# Patient Record
Sex: MALE | Race: WHITE | Employment: OTHER | URBAN - METROPOLITAN AREA
[De-identification: names, ages, dates, MRNs, and addresses within clinical notes are randomized per-mention and may not be internally consistent; named-entity substitution may affect disease eponyms.]

---

## 2017-11-07 PROBLEM — I25.10 CORONARY ARTERY DISEASE INVOLVING NATIVE CORONARY ARTERY OF NATIVE HEART WITHOUT ANGINA PECTORIS: Status: ACTIVE | Noted: 2017-11-07

## 2017-11-07 PROBLEM — Z95.810 ICD (IMPLANTABLE CARDIOVERTER-DEFIBRILLATOR) IN PLACE: Status: ACTIVE | Noted: 2017-11-07

## 2019-02-11 NOTE — PROGRESS NOTES
Patient pre-assessment complete for ICD Generator change with DR Myrna Contreras scheduled for 19 at 2:30pm , arrival time 12:30pm. Patient verified using . Patient instructed to bring all home medications in labeled bottles on the day of procedure. NPO status reinforced. Patient instructed to HOLD spironolactone & torsemide the am of procedure. Instructed they can take all other medications excluding vitamins & supplements. Patient verbalizes understanding of all instructions & denies any questions at this time.

## 2019-02-12 ENCOUNTER — ANESTHESIA EVENT (OUTPATIENT)
Dept: SURGERY | Age: 69
End: 2019-02-12
Payer: MEDICARE

## 2019-02-13 ENCOUNTER — ANESTHESIA (OUTPATIENT)
Dept: SURGERY | Age: 69
End: 2019-02-13
Payer: MEDICARE

## 2019-02-13 ENCOUNTER — HOSPITAL ENCOUNTER (OUTPATIENT)
Age: 69
Setting detail: OUTPATIENT SURGERY
Discharge: HOME | End: 2019-02-13
Attending: INTERNAL MEDICINE | Admitting: INTERNAL MEDICINE
Payer: MEDICARE

## 2019-02-13 ENCOUNTER — APPOINTMENT (OUTPATIENT)
Dept: CARDIAC CATH/INVASIVE PROCEDURES | Age: 69
End: 2019-02-13
Attending: INTERNAL MEDICINE
Payer: MEDICARE

## 2019-02-13 VITALS
OXYGEN SATURATION: 97 % | BODY MASS INDEX: 40.43 KG/M2 | DIASTOLIC BLOOD PRESSURE: 72 MMHG | SYSTOLIC BLOOD PRESSURE: 155 MMHG | RESPIRATION RATE: 20 BRPM | HEIGHT: 74 IN | WEIGHT: 315 LBS | TEMPERATURE: 97 F | HEART RATE: 70 BPM

## 2019-02-13 LAB
ANION GAP SERPL CALC-SCNC: 7 MMOL/L (ref 7–16)
ATRIAL RATE: 78 BPM
BUN SERPL-MCNC: 17 MG/DL (ref 8–23)
CALCIUM SERPL-MCNC: 8.8 MG/DL (ref 8.3–10.4)
CALCULATED R AXIS, ECG10: -62 DEGREES
CALCULATED T AXIS, ECG11: 94 DEGREES
CHLORIDE SERPL-SCNC: 108 MMOL/L (ref 98–107)
CO2 SERPL-SCNC: 26 MMOL/L (ref 21–32)
CREAT SERPL-MCNC: 1.6 MG/DL (ref 0.8–1.5)
DIAGNOSIS, 93000: NORMAL
ERYTHROCYTE [DISTWIDTH] IN BLOOD BY AUTOMATED COUNT: 13.9 % (ref 11.9–14.6)
GLUCOSE BLD STRIP.AUTO-MCNC: 89 MG/DL (ref 65–100)
GLUCOSE SERPL-MCNC: 89 MG/DL (ref 65–100)
HCT VFR BLD AUTO: 41.7 % (ref 41.1–50.3)
HGB BLD-MCNC: 13.1 G/DL (ref 13.6–17.2)
INR PPP: 1
MAGNESIUM SERPL-MCNC: 2 MG/DL (ref 1.8–2.4)
MCH RBC QN AUTO: 29.4 PG (ref 26.1–32.9)
MCHC RBC AUTO-ENTMCNC: 31.4 G/DL (ref 31.4–35)
MCV RBC AUTO: 93.5 FL (ref 79.6–97.8)
NRBC # BLD: 0 K/UL (ref 0–0.2)
PLATELET # BLD AUTO: 209 K/UL (ref 150–450)
PMV BLD AUTO: 9.6 FL (ref 9.4–12.3)
POTASSIUM SERPL-SCNC: 4.2 MMOL/L (ref 3.5–5.1)
PROTHROMBIN TIME: 13 SEC (ref 11.7–14.5)
Q-T INTERVAL, ECG07: 440 MS
QRS DURATION, ECG06: 156 MS
QTC CALCULATION (BEZET), ECG08: 495 MS
RBC # BLD AUTO: 4.46 M/UL (ref 4.23–5.6)
SODIUM SERPL-SCNC: 141 MMOL/L (ref 136–145)
VENTRICULAR RATE, ECG03: 76 BPM
WBC # BLD AUTO: 5.4 K/UL (ref 4.3–11.1)

## 2019-02-13 PROCEDURE — 74011250636 HC RX REV CODE- 250/636: Performed by: INTERNAL MEDICINE

## 2019-02-13 PROCEDURE — 74011250636 HC RX REV CODE- 250/636

## 2019-02-13 PROCEDURE — 33264 RMVL & RPLCMT DFB GEN MLT LD: CPT

## 2019-02-13 PROCEDURE — 77030011747 HC SEAL HEMSTAT TELE -C

## 2019-02-13 PROCEDURE — 93005 ELECTROCARDIOGRAM TRACING: CPT | Performed by: INTERNAL MEDICINE

## 2019-02-13 PROCEDURE — 77030028698 HC BLD TISS PLSM MEDT -D

## 2019-02-13 PROCEDURE — 74011250637 HC RX REV CODE- 250/637: Performed by: INTERNAL MEDICINE

## 2019-02-13 PROCEDURE — C1882 AICD, OTHER THAN SING/DUAL: HCPCS

## 2019-02-13 PROCEDURE — 77030019557 HC ELECTRD VES SEAL MEDT -F

## 2019-02-13 PROCEDURE — 80048 BASIC METABOLIC PNL TOTAL CA: CPT

## 2019-02-13 PROCEDURE — 77030012935 HC DRSG AQUACEL BMS -B

## 2019-02-13 PROCEDURE — 85610 PROTHROMBIN TIME: CPT

## 2019-02-13 PROCEDURE — 77030008467 HC STPLR SKN COVD -B

## 2019-02-13 PROCEDURE — 85027 COMPLETE CBC AUTOMATED: CPT

## 2019-02-13 PROCEDURE — 76060000033 HC ANESTHESIA 1 TO 1.5 HR: Performed by: INTERNAL MEDICINE

## 2019-02-13 PROCEDURE — 74011000250 HC RX REV CODE- 250: Performed by: INTERNAL MEDICINE

## 2019-02-13 PROCEDURE — 82962 GLUCOSE BLOOD TEST: CPT

## 2019-02-13 PROCEDURE — 83735 ASSAY OF MAGNESIUM: CPT

## 2019-02-13 PROCEDURE — 74011000272 HC RX REV CODE- 272: Performed by: INTERNAL MEDICINE

## 2019-02-13 RX ORDER — BUPIVACAINE HYDROCHLORIDE AND EPINEPHRINE 5; 5 MG/ML; UG/ML
60 INJECTION, SOLUTION EPIDURAL; INTRACAUDAL; PERINEURAL ONCE
Status: COMPLETED | OUTPATIENT
Start: 2019-02-13 | End: 2019-02-13

## 2019-02-13 RX ORDER — ACETAMINOPHEN 325 MG/1
325-650 TABLET ORAL
Status: DISCONTINUED | OUTPATIENT
Start: 2019-02-13 | End: 2019-02-13 | Stop reason: HOSPADM

## 2019-02-13 RX ORDER — PROPOFOL 10 MG/ML
INJECTION, EMULSION INTRAVENOUS
Status: DISCONTINUED | OUTPATIENT
Start: 2019-02-13 | End: 2019-02-13 | Stop reason: HOSPADM

## 2019-02-13 RX ORDER — LIDOCAINE HYDROCHLORIDE 20 MG/ML
INJECTION, SOLUTION EPIDURAL; INFILTRATION; INTRACAUDAL; PERINEURAL AS NEEDED
Status: DISCONTINUED | OUTPATIENT
Start: 2019-02-13 | End: 2019-02-13 | Stop reason: HOSPADM

## 2019-02-13 RX ORDER — SODIUM CHLORIDE, SODIUM LACTATE, POTASSIUM CHLORIDE, CALCIUM CHLORIDE 600; 310; 30; 20 MG/100ML; MG/100ML; MG/100ML; MG/100ML
100 INJECTION, SOLUTION INTRAVENOUS CONTINUOUS
Status: DISCONTINUED | OUTPATIENT
Start: 2019-02-13 | End: 2019-02-13 | Stop reason: HOSPADM

## 2019-02-13 RX ORDER — PROPOFOL 10 MG/ML
INJECTION, EMULSION INTRAVENOUS AS NEEDED
Status: DISCONTINUED | OUTPATIENT
Start: 2019-02-13 | End: 2019-02-13 | Stop reason: HOSPADM

## 2019-02-13 RX ORDER — SODIUM CHLORIDE, SODIUM LACTATE, POTASSIUM CHLORIDE, CALCIUM CHLORIDE 600; 310; 30; 20 MG/100ML; MG/100ML; MG/100ML; MG/100ML
INJECTION, SOLUTION INTRAVENOUS
Status: DISCONTINUED | OUTPATIENT
Start: 2019-02-13 | End: 2019-02-13 | Stop reason: HOSPADM

## 2019-02-13 RX ADMIN — Medication 3 G: at 13:42

## 2019-02-13 RX ADMIN — PROPOFOL 100 MCG/KG/MIN: 10 INJECTION, EMULSION INTRAVENOUS at 13:43

## 2019-02-13 RX ADMIN — PROPOFOL 20 MG: 10 INJECTION, EMULSION INTRAVENOUS at 14:25

## 2019-02-13 RX ADMIN — SODIUM CHLORIDE, SODIUM LACTATE, POTASSIUM CHLORIDE, CALCIUM CHLORIDE: 600; 310; 30; 20 INJECTION, SOLUTION INTRAVENOUS at 13:36

## 2019-02-13 RX ADMIN — NEOMYCIN AND POLYMYXIN B SULFATES: 40; 200000 IRRIGANT IRRIGATION at 14:01

## 2019-02-13 RX ADMIN — BUPIVACAINE HYDROCHLORIDE AND EPINEPHRINE BITARTRATE 150 MG: 5; .005 INJECTION, SOLUTION EPIDURAL; INTRACAUDAL; PERINEURAL at 14:01

## 2019-02-13 RX ADMIN — PROPOFOL 20 MG: 10 INJECTION, EMULSION INTRAVENOUS at 14:39

## 2019-02-13 RX ADMIN — PROPOFOL 50 MG: 10 INJECTION, EMULSION INTRAVENOUS at 13:39

## 2019-02-13 RX ADMIN — LIDOCAINE HYDROCHLORIDE 50 MG: 20 INJECTION, SOLUTION EPIDURAL; INFILTRATION; INTRACAUDAL; PERINEURAL at 13:40

## 2019-02-13 RX ADMIN — Medication 3 G: at 17:00

## 2019-02-13 RX ADMIN — ACETAMINOPHEN 650 MG: 325 TABLET, FILM COATED ORAL at 15:00

## 2019-02-13 RX ADMIN — PROPOFOL 20 MG: 10 INJECTION, EMULSION INTRAVENOUS at 14:30

## 2019-02-13 RX ADMIN — PROPOFOL 30 MG: 10 INJECTION, EMULSION INTRAVENOUS at 13:57

## 2019-02-13 NOTE — ANESTHESIA PREPROCEDURE EVALUATION
Anesthetic History No history of anesthetic complications Review of Systems / Medical History Patient summary reviewed and pertinent labs reviewed Pulmonary COPD: moderate Neuro/Psych Within defined limits Cardiovascular Hypertension Dysrhythmias : atrial fibrillation Pacemaker, CAD and CABG Comments: H/O CM - last ECHO '17 showed preserved EF  
GI/Hepatic/Renal 
Within defined limits Endo/Other Morbid obesity Other Findings Physical Exam 
 
Airway Mallampati: III 
TM Distance: 4 - 6 cm Neck ROM: normal range of motion Mouth opening: Normal 
 
 Cardiovascular Rhythm: regular Rate: normal 
 
 
 
 Dental 
 
 
  
Pulmonary Breath sounds clear to auscultation Abdominal 
 
 
 
 Other Findings Anesthetic Plan ASA: 3 Anesthesia type: total IV anesthesia Induction: Intravenous Anesthetic plan and risks discussed with: Patient

## 2019-02-13 NOTE — PROCEDURES
Procedure: BiV ICD Generator Removal/Replacement without DFTs    Pre-Procedure Diagnosis  1. Chronic systolic heart failure, ejection fraction of 55%. 2. Non ischemic dilated cardiomyopathy. 3. Class II-III heart failure symptoms. 4. Chronic systolic heart failure for a minimum of 3 months duration on optimal medical therapy. 5. BiV ICD ANDRÉS  6. Permanent atrial fibrillation    Procedure Performed  1. Insertion/replacement of Medtronic biventricular implantable cardioverter defibrillator. Anesthesia: MAC     Estimated Blood Loss: Less than 10 mL     Specimens: * No specimens in log *     Patient Information and Indications: The procedure, indications, risks, benefits, and alternatives were discussed with the patient and family members, who desired to proceed after questions were answered and informed consent was documented. Methods: After informed consent was obtained, the patient was brought to the Electrophysiology Laboratory in a fasting state and was prepped and draped in sterile fashion. Prophylactic antibiotic was administered prior to skin incision: (Ancef 3 gm). Conscious sedation was administered with continuous oxygen saturation measurement and blood pressure measurement by Anesthesia. Local anesthetic (lidocaine) was delivered to the left pectoral region and an incision was made parallel to the deltopectoral groove directly over the prior surgical scar. The subcutaneous pocket was opened using blunt dissection and electrocautery, and adequate hemostasis was established. The device was freed from overlying fibrotic tissue and the leads freed to give enough slack for device exchange. The leads were individually removed from the old generator and tested using the PSA revealing excellent pacing/sensing parameters. The lead pins were then cleaned with antibiotic soaked gauze, dried gently, and attached to a new biventricular ICD generator.  Pins were directly observed to pass the tip electrode, and the ring hex wrench screws were secured, and leads tug tested. The device and leads were gently positioned within the pocket. The pocket was irrigated copiously with a saline antimicrobial solution. The device was and leads were tested a second time prior to pocket closure. The wound was closed with multiple layers of absorbable suture followed by skin closure with staples. The patient tolerated the procedure well and left the lab in good condition.      Lead Data:    Pulse Generator Model #  Serial # Location Implant   [id number] Amplia SKIP [id number] Left Pectoral 02-13-19   Explant-[id number] MDT [id number] Left pectoral       03-16-12     Lead Model Number  Serial Number Lead position Implant   Chronic RA [id number] MDT [id number] [place] 04-08-08   Chronic RV     2210-59          MDT [id number] RV [place] 04-08-08     Chronic LV       4194-88         MDT [id number] LV 04-08-08             Lead Sensitivity and Threshold  Lead R or P sensitivity (mv) Threshold (V) Threshold PW (msec) Impedance (ohms) Final output Voltage (V) Final PW (msec)   RA n/a   399     RV [email or web address] .75 .4 418 2.0 .4   LV  Tip-Ring  .75 .4 1235 1.75 .4     Bradycardia Settings  Siddharth Mode LRL URL Pace AVD (ms) Sense AVD (ms) Rate Response Mode Switching Mode SW Rate   VVIR 70 130   ON         Tachycardia Settings  Zone Type VT-1 FVT-via VF VF   ON/OFF/  MONITOR           On  on  ATP during charge   Zone Rate           176       (48  beats)               200          (24/32 beats)   1st Therapy Type Burst  x 3   88%  Shock   Energy (J)                35   2nd Therapy Type     shock      Shock   Energy (J)            20  35   3rd Therapy Type      shock  Shock   Energy (J) 35  35   4th Therapy Type shock  Shock   Energy (J) 35  35   5th Therapy Type shock  Shock   Energy (J) 35  35   6th Therapy Type shock  Shock   Energy (J) 35  35           Defibrillation Threshold Testing  DFT# How induced Successful test? Shock Imped (ohms) Energy (J) Charge time (sec) Rescue needed? Defib threshold (J)   n/a                      TYRX model [id number] sn [id number] used in pocket     Fluoroscopy Time:   n/a    Complications: None    IMPRESSION: 1) Successful BiV ICD generator replacement without DFTs. Tomas Lui MD, [place]  Clinical Cardiac Electrophysiology  2/13/2019  2:44 PM

## 2019-02-13 NOTE — DISCHARGE INSTRUCTIONS
PACEMAKER INSTRUCTIONS SHEET  · Keep your incision dry for 10 days. DO NOT put salves, ointments, and/or lotions on the incision. · The pieces of tape on the incision will come off by themselves when you begin washing the site. Please do not pull or tear them off. · You may use your pacemaker arm; but DO NOT raise the arm higher than your shoulder for the first two weeks to prevent the pacemaker lead from moving. DO NOT immobilize your pacemaker arm. · Call us IMMEDIATELY if you develop fever, pain, redness, and/or drainage at the pacemaker arm. · Do not lift more than 10 pounds for 2 weeks and 20 pounds for 1 month. · Microwaves WILL NOT harm your pacemaker. Warning does not apply to you. · Avoid activities which can reprogram your pacemaker such as arc welding, ham radios, and tanning booths. At airports, always show your pacemaker identification card. You may walk through the metal detector, but do not allow the hand held wand near your pacemaker. Do not have a MRI or NMR scan without talking to your cardiologist.  · Your pacemakers function will be evaluated over the telephone. Within 3 weeks you should receive a schedule. If you do not receive a schedule, or if you have questions, you may call [facility]. · Remove the battery from the transmitter after each use. Always keep a spare 9 volt battery. · The pacemaker battery is tested by the heart rate with the magnet applied. This test is done each time you transmit your ECG so it is very important that you follow your schedule. · Carry your pacemaker identification card at all times. Within 6 weeks, you should receive your permanent card. Keep your temporary card as a spare. Call [facility] if you do not receive your card or if you lose your card. · Always show the doctor or dentist your pacemaker identification card.   · If you have questions about your pacemaker or office appointment, please call the office of [facility] at [phone]. · Following the pacemaker implant, you will need to return to the clinic to see your doctor within 1 week. If you did not receive an appointment, please call [facility].

## 2019-02-13 NOTE — PROGRESS NOTES
TRANSFER - IN REPORT:    Verbal report received from [address] Belinda RN(name) on Anuj Ren  being received from EP lab(unit) for routine progression of care      Report consisted of patients Situation, Background, Assessment and   Recommendations(SBAR). Information from the following report(s) Procedure Summary was reviewed with the receiving nurse. Opportunity for questions and clarification was provided. Assessment completed upon patients arrival to unit and care assumed.

## 2019-02-13 NOTE — PROGRESS NOTES
Patient understands that he was scheduled for a JENNIFER today as well as generator change. Patient states he does not want to proceed with the JENNIFER, he \"sees no need in it\" and wants to restart his Coumadin for stroke prevention.

## 2019-02-14 NOTE — ANESTHESIA POSTPROCEDURE EVALUATION
Procedure(s): PACEMAKER GEN CHANGE. Anesthesia Post Evaluation Multimodal analgesia: multimodal analgesia used between 6 hours prior to anesthesia start to PACU discharge Patient location during evaluation: PACU Patient participation: complete - patient participated Level of consciousness: awake and alert Pain management: adequate Airway patency: patent Anesthetic complications: no 
Cardiovascular status: acceptable and hemodynamically stable Respiratory status: acceptable Hydration status: acceptable Visit Vitals /72 Pulse 70 Temp 36.1 °C (97 °F) Resp 20 Ht 6' 2\" (1.88 m) Wt 147 kg (324 lb) SpO2 97% BMI 41.60 kg/m²

## 2020-06-09 PROBLEM — I25.118 CORONARY ARTERY DISEASE WITH STABLE ANGINA PECTORIS (HCC): Status: ACTIVE | Noted: 2020-06-09

## 2022-01-24 ENCOUNTER — HOSPITAL ENCOUNTER (INPATIENT)
Age: 72
LOS: 2 days | Discharge: HOME | DRG: 281 | End: 2022-01-26
Attending: EMERGENCY MEDICINE | Admitting: INTERNAL MEDICINE
Payer: MEDICARE

## 2022-01-24 ENCOUNTER — APPOINTMENT (OUTPATIENT)
Dept: GENERAL RADIOLOGY | Age: 72
DRG: 281 | End: 2022-01-24
Attending: EMERGENCY MEDICINE
Payer: MEDICARE

## 2022-01-24 DIAGNOSIS — I48.20 CHRONIC ATRIAL FIBRILLATION (HCC): ICD-10-CM

## 2022-01-24 DIAGNOSIS — N17.9 AKI (ACUTE KIDNEY INJURY) (HCC): ICD-10-CM

## 2022-01-24 DIAGNOSIS — E78.5 HYPERLIPIDEMIA, UNSPECIFIED HYPERLIPIDEMIA TYPE: ICD-10-CM

## 2022-01-24 DIAGNOSIS — I15.1 HYPERTENSION SECONDARY TO OTHER RENAL DISORDERS: ICD-10-CM

## 2022-01-24 DIAGNOSIS — N28.89 HYPERTENSION SECONDARY TO OTHER RENAL DISORDERS: ICD-10-CM

## 2022-01-24 DIAGNOSIS — I25.10 CORONARY ARTERY DISEASE INVOLVING NATIVE CORONARY ARTERY OF NATIVE HEART WITHOUT ANGINA PECTORIS: ICD-10-CM

## 2022-01-24 DIAGNOSIS — R07.9 CHEST PAIN, UNSPECIFIED TYPE: ICD-10-CM

## 2022-01-24 DIAGNOSIS — I21.4 NSTEMI (NON-ST ELEVATED MYOCARDIAL INFARCTION) (HCC): Primary | ICD-10-CM

## 2022-01-24 DIAGNOSIS — I10 ESSENTIAL HYPERTENSION: ICD-10-CM

## 2022-01-24 PROBLEM — E66.01 MORBID OBESITY (HCC): Chronic | Status: ACTIVE | Noted: 2022-01-24

## 2022-01-24 LAB
ALBUMIN SERPL-MCNC: 3.3 G/DL (ref 3.2–4.6)
ALBUMIN/GLOB SERPL: 0.8 {RATIO} (ref 1.2–3.5)
ALP SERPL-CCNC: 114 U/L (ref 50–136)
ALT SERPL-CCNC: 26 U/L (ref 12–65)
ANION GAP SERPL CALC-SCNC: 8 MMOL/L (ref 7–16)
AST SERPL-CCNC: 42 U/L (ref 15–37)
ATRIAL RATE: 93 BPM
BASOPHILS # BLD: 0.1 K/UL (ref 0–0.2)
BASOPHILS NFR BLD: 1 % (ref 0–2)
BILIRUB SERPL-MCNC: 0.5 MG/DL (ref 0.2–1.1)
BNP SERPL-MCNC: 6059 PG/ML (ref 5–125)
BUN SERPL-MCNC: 26 MG/DL (ref 8–23)
CALCIUM SERPL-MCNC: 9.8 MG/DL (ref 8.3–10.4)
CALCULATED R AXIS, ECG10: -66 DEGREES
CALCULATED T AXIS, ECG11: 130 DEGREES
CHLORIDE SERPL-SCNC: 103 MMOL/L (ref 98–107)
CO2 SERPL-SCNC: 29 MMOL/L (ref 21–32)
CREAT SERPL-MCNC: 2.12 MG/DL (ref 0.8–1.5)
DIAGNOSIS, 93000: NORMAL
DIFFERENTIAL METHOD BLD: ABNORMAL
EOSINOPHIL # BLD: 0.2 K/UL (ref 0–0.8)
EOSINOPHIL NFR BLD: 2 % (ref 0.5–7.8)
ERYTHROCYTE [DISTWIDTH] IN BLOOD BY AUTOMATED COUNT: 12.7 % (ref 11.9–14.6)
EST. AVERAGE GLUCOSE BLD GHB EST-MCNC: 100 MG/DL
GLOBULIN SER CALC-MCNC: 4.3 G/DL (ref 2.3–3.5)
GLUCOSE SERPL-MCNC: 82 MG/DL (ref 65–100)
HBA1C MFR BLD: 5.1 % (ref 4.2–6.3)
HCT VFR BLD AUTO: 44 % (ref 41.1–50.3)
HGB BLD-MCNC: 14 G/DL (ref 13.6–17.2)
IMM GRANULOCYTES # BLD AUTO: 0 K/UL (ref 0–0.5)
IMM GRANULOCYTES NFR BLD AUTO: 0 % (ref 0–5)
LIPASE SERPL-CCNC: 78 U/L (ref 73–393)
LYMPHOCYTES # BLD: 1 K/UL (ref 0.5–4.6)
LYMPHOCYTES NFR BLD: 12 % (ref 13–44)
MAGNESIUM SERPL-MCNC: 2.2 MG/DL (ref 1.8–2.4)
MCH RBC QN AUTO: 29.6 PG (ref 26.1–32.9)
MCHC RBC AUTO-ENTMCNC: 31.8 G/DL (ref 31.4–35)
MCV RBC AUTO: 93 FL (ref 79.6–97.8)
MONOCYTES # BLD: 0.8 K/UL (ref 0.1–1.3)
MONOCYTES NFR BLD: 10 % (ref 4–12)
NEUTS SEG # BLD: 6.1 K/UL (ref 1.7–8.2)
NEUTS SEG NFR BLD: 75 % (ref 43–78)
NRBC # BLD: 0 K/UL (ref 0–0.2)
PLATELET # BLD AUTO: 224 K/UL (ref 150–450)
PMV BLD AUTO: 10.2 FL (ref 9.4–12.3)
POTASSIUM SERPL-SCNC: 3.5 MMOL/L (ref 3.5–5.1)
PROT SERPL-MCNC: 7.6 G/DL (ref 6.3–8.2)
Q-T INTERVAL, ECG07: 414 MS
QRS DURATION, ECG06: 156 MS
QTC CALCULATION (BEZET), ECG08: 495 MS
RBC # BLD AUTO: 4.73 M/UL (ref 4.23–5.6)
SODIUM SERPL-SCNC: 140 MMOL/L (ref 138–145)
TROPONIN-HIGH SENSITIVITY: 4987.1 PG/ML (ref 0–14)
TROPONIN-HIGH SENSITIVITY: 5203.5 PG/ML (ref 0–14)
TSH SERPL DL<=0.005 MIU/L-ACNC: 3.51 UIU/ML (ref 0.36–3.74)
VENTRICULAR RATE, ECG03: 86 BPM
WBC # BLD AUTO: 8.2 K/UL (ref 4.3–11.1)

## 2022-01-24 PROCEDURE — 2709999900 HC NON-CHARGEABLE SUPPLY

## 2022-01-24 PROCEDURE — 74011250637 HC RX REV CODE- 250/637: Performed by: EMERGENCY MEDICINE

## 2022-01-24 PROCEDURE — 65660000000 HC RM CCU STEPDOWN

## 2022-01-24 PROCEDURE — 71046 X-RAY EXAM CHEST 2 VIEWS: CPT

## 2022-01-24 PROCEDURE — 84443 ASSAY THYROID STIM HORMONE: CPT

## 2022-01-24 PROCEDURE — 99223 1ST HOSP IP/OBS HIGH 75: CPT | Performed by: INTERNAL MEDICINE

## 2022-01-24 PROCEDURE — 74011250636 HC RX REV CODE- 250/636: Performed by: PHYSICIAN ASSISTANT

## 2022-01-24 PROCEDURE — 83735 ASSAY OF MAGNESIUM: CPT

## 2022-01-24 PROCEDURE — 99284 EMERGENCY DEPT VISIT MOD MDM: CPT

## 2022-01-24 PROCEDURE — 83690 ASSAY OF LIPASE: CPT

## 2022-01-24 PROCEDURE — 84484 ASSAY OF TROPONIN QUANT: CPT

## 2022-01-24 PROCEDURE — 93005 ELECTROCARDIOGRAM TRACING: CPT | Performed by: EMERGENCY MEDICINE

## 2022-01-24 PROCEDURE — 83880 ASSAY OF NATRIURETIC PEPTIDE: CPT

## 2022-01-24 PROCEDURE — 85025 COMPLETE CBC W/AUTO DIFF WBC: CPT

## 2022-01-24 PROCEDURE — 94762 N-INVAS EAR/PLS OXIMTRY CONT: CPT

## 2022-01-24 PROCEDURE — 74011250637 HC RX REV CODE- 250/637: Performed by: PHYSICIAN ASSISTANT

## 2022-01-24 PROCEDURE — 74011250636 HC RX REV CODE- 250/636: Performed by: EMERGENCY MEDICINE

## 2022-01-24 PROCEDURE — 74011000250 HC RX REV CODE- 250: Performed by: PHYSICIAN ASSISTANT

## 2022-01-24 PROCEDURE — 83036 HEMOGLOBIN GLYCOSYLATED A1C: CPT

## 2022-01-24 PROCEDURE — 80053 COMPREHEN METABOLIC PANEL: CPT

## 2022-01-24 PROCEDURE — 74011000250 HC RX REV CODE- 250: Performed by: EMERGENCY MEDICINE

## 2022-01-24 RX ORDER — SODIUM CHLORIDE 0.9 % (FLUSH) 0.9 %
5-10 SYRINGE (ML) INJECTION AS NEEDED
Status: DISCONTINUED | OUTPATIENT
Start: 2022-01-24 | End: 2022-01-26 | Stop reason: HOSPADM

## 2022-01-24 RX ORDER — SODIUM CHLORIDE 0.9 % (FLUSH) 0.9 %
5-40 SYRINGE (ML) INJECTION EVERY 8 HOURS
Status: DISCONTINUED | OUTPATIENT
Start: 2022-01-24 | End: 2022-01-26 | Stop reason: HOSPADM

## 2022-01-24 RX ORDER — ATORVASTATIN CALCIUM 80 MG/1
80 TABLET, FILM COATED ORAL
Status: DISCONTINUED | OUTPATIENT
Start: 2022-01-24 | End: 2022-01-26 | Stop reason: HOSPADM

## 2022-01-24 RX ORDER — ISOSORBIDE MONONITRATE 60 MG/1
60 TABLET, EXTENDED RELEASE ORAL DAILY
Status: DISCONTINUED | OUTPATIENT
Start: 2022-01-25 | End: 2022-01-24

## 2022-01-24 RX ORDER — HEPARIN SODIUM 1000 [USP'U]/ML
60 INJECTION, SOLUTION INTRAVENOUS; SUBCUTANEOUS ONCE
Status: COMPLETED | OUTPATIENT
Start: 2022-01-24 | End: 2022-01-24

## 2022-01-24 RX ORDER — SODIUM CHLORIDE 0.9 % (FLUSH) 0.9 %
5-40 SYRINGE (ML) INJECTION AS NEEDED
Status: DISCONTINUED | OUTPATIENT
Start: 2022-01-24 | End: 2022-01-26 | Stop reason: HOSPADM

## 2022-01-24 RX ORDER — DOXEPIN HYDROCHLORIDE 10 MG/1
30 CAPSULE ORAL
Status: DISCONTINUED | OUTPATIENT
Start: 2022-01-24 | End: 2022-01-26 | Stop reason: HOSPADM

## 2022-01-24 RX ORDER — SODIUM CHLORIDE 9 MG/ML
75 INJECTION, SOLUTION INTRAVENOUS CONTINUOUS
Status: DISCONTINUED | OUTPATIENT
Start: 2022-01-24 | End: 2022-01-25

## 2022-01-24 RX ORDER — ONDANSETRON 2 MG/ML
4 INJECTION INTRAMUSCULAR; INTRAVENOUS
Status: DISCONTINUED | OUTPATIENT
Start: 2022-01-24 | End: 2022-01-26

## 2022-01-24 RX ORDER — MORPHINE SULFATE 2 MG/ML
2 INJECTION, SOLUTION INTRAMUSCULAR; INTRAVENOUS
Status: DISCONTINUED | OUTPATIENT
Start: 2022-01-24 | End: 2022-01-26

## 2022-01-24 RX ORDER — NITROGLYCERIN 0.4 MG/1
0.4 TABLET SUBLINGUAL
Status: DISCONTINUED | OUTPATIENT
Start: 2022-01-24 | End: 2022-01-26 | Stop reason: HOSPADM

## 2022-01-24 RX ORDER — DULOXETIN HYDROCHLORIDE 60 MG/1
60 CAPSULE, DELAYED RELEASE ORAL
COMMUNITY

## 2022-01-24 RX ORDER — SODIUM CHLORIDE 0.9 % (FLUSH) 0.9 %
5-40 SYRINGE (ML) INJECTION EVERY 8 HOURS
Status: DISCONTINUED | OUTPATIENT
Start: 2022-01-24 | End: 2022-01-25 | Stop reason: HOSPADM

## 2022-01-24 RX ORDER — SODIUM CHLORIDE 0.9 % (FLUSH) 0.9 %
5-40 SYRINGE (ML) INJECTION AS NEEDED
Status: DISCONTINUED | OUTPATIENT
Start: 2022-01-24 | End: 2022-01-25 | Stop reason: HOSPADM

## 2022-01-24 RX ORDER — DOCUSATE SODIUM 100 MG/1
100 CAPSULE, LIQUID FILLED ORAL DAILY
Status: DISCONTINUED | OUTPATIENT
Start: 2022-01-25 | End: 2022-01-26 | Stop reason: HOSPADM

## 2022-01-24 RX ORDER — HEPARIN SODIUM 5000 [USP'U]/100ML
12-25 INJECTION, SOLUTION INTRAVENOUS
Status: DISCONTINUED | OUTPATIENT
Start: 2022-01-24 | End: 2022-01-25

## 2022-01-24 RX ORDER — DULOXETIN HYDROCHLORIDE 60 MG/1
60 CAPSULE, DELAYED RELEASE ORAL DAILY
Status: DISCONTINUED | OUTPATIENT
Start: 2022-01-25 | End: 2022-01-26 | Stop reason: HOSPADM

## 2022-01-24 RX ORDER — SODIUM CHLORIDE 0.9 % (FLUSH) 0.9 %
5-10 SYRINGE (ML) INJECTION EVERY 8 HOURS
Status: DISCONTINUED | OUTPATIENT
Start: 2022-01-24 | End: 2022-01-26 | Stop reason: HOSPADM

## 2022-01-24 RX ORDER — ASPIRIN 81 MG/1
81 TABLET ORAL DAILY
Status: DISCONTINUED | OUTPATIENT
Start: 2022-01-25 | End: 2022-01-26 | Stop reason: HOSPADM

## 2022-01-24 RX ORDER — CARVEDILOL 25 MG/1
25 TABLET ORAL 2 TIMES DAILY WITH MEALS
Status: DISCONTINUED | OUTPATIENT
Start: 2022-01-24 | End: 2022-01-26 | Stop reason: HOSPADM

## 2022-01-24 RX ADMIN — SODIUM CHLORIDE 75 ML/HR: 900 INJECTION, SOLUTION INTRAVENOUS at 18:13

## 2022-01-24 RX ADMIN — HEPARIN SODIUM 6550 UNITS: 1000 INJECTION INTRAVENOUS; SUBCUTANEOUS at 17:25

## 2022-01-24 RX ADMIN — NITROGLYCERIN 1 INCH: 20 OINTMENT TOPICAL at 17:17

## 2022-01-24 RX ADMIN — CARVEDILOL 25 MG: 25 TABLET, FILM COATED ORAL at 18:12

## 2022-01-24 RX ADMIN — SODIUM CHLORIDE, PRESERVATIVE FREE 10 ML: 5 INJECTION INTRAVENOUS at 22:33

## 2022-01-24 RX ADMIN — DOXEPIN HYDROCHLORIDE 30 MG: 10 CAPSULE ORAL at 22:32

## 2022-01-24 RX ADMIN — HEPARIN SODIUM 12 UNITS/KG/HR: 5000 INJECTION, SOLUTION INTRAVENOUS at 17:28

## 2022-01-24 RX ADMIN — MORPHINE SULFATE 2 MG: 2 INJECTION, SOLUTION INTRAMUSCULAR; INTRAVENOUS at 22:32

## 2022-01-24 RX ADMIN — ATORVASTATIN CALCIUM 80 MG: 80 TABLET, FILM COATED ORAL at 22:32

## 2022-01-24 NOTE — Clinical Note
TRANSFER - IN REPORT:     Verbal report received from: 3rd floor . Report consisted of patient's Situation, Background, Assessment and   Recommendations(SBAR). Opportunity for questions and clarification was provided. Assessment completed upon patient's arrival to unit and care assumed.

## 2022-01-24 NOTE — Clinical Note
Multiple views of the internal mammary artery to the obtuse marginal obtained using power injection.

## 2022-01-24 NOTE — ED PROVIDER NOTES
80-year-old male presenting to the emergency department a complaining of chest pain which he describes a squeezing heavy pressure in the mid chest when he is active. Patient states when he is active is only minimal activity to cause the pain. He is notes that started on Friday and has been on and off since. He states that the pain is now radiating down into both of his arms which is new. He states he has never had a stent placed but had a quad bypass done in 2000.  5 years ago he was taken off of his blood thinners except for a baby aspirin daily because of a renal infarct. The patient says that he has had some increased fatigue with his symptoms. He denies any tobacco alcohol or recreational drug use           Past Medical History:   Diagnosis Date    Arrhythmia     a fib; s/p ablation; pacer dependent    Arthritis     Atrial fibrillation ([address] 06/2000    CAD (coronary artery disease)     Chest pain 05/2000    Chronic kidney disease     1 kidney    Chronic obstructive pulmonary disease ([address]     Coronary artery disease involving native coronary artery of native heart without angina pectoris 11/7/2017    Dyspnea 3/1/2016    Edema 08/2010    Heart failure ([address]     Hypercholesterolemia     Hypertension     Ischemic cardiomyopathy 3/1/2016    LV dysfunction 06/2000    Morbid obesity ([address]     Other ill-defined conditions(799.89)     MI x2    Pacemaker 07/2009    Thrombus 03/2012    Unstable angina ([address] 09/2000       Past Surgical History:   Procedure Laterality Date    HX BIV-IMPLANTABLE CARDIOVERTER DEFIBRILLATOR      HX CORONARY ARTERY BYPASS GRAFT      HX PACEMAKER      ICD    [place] CARDIAC SURG PROCEDURE UNLIST      S/P CABG         History reviewed. No pertinent family history.     Social History     Socioeconomic History    Marital status:      Spouse name: Not on file    Number of children: Not on file    Years of education: Not on file    Highest education level: Not on file   Occupational History    Not on file   Tobacco Use    Smoking status: Former Smoker     Packs/day: 1.00     Years: 30.00     Pack years: 30.00     Quit date: 2000     Years since quittin.0    Smokeless tobacco: Current User    Tobacco comment: STOPPED IN    Substance and Sexual Activity    Alcohol use: No    Drug use: No    Sexual activity: Not on file   Other Topics Concern    Not on file   Social History Narrative    Not on file     Social Determinants of Health     Financial Resource Strain:     Difficulty of Paying Living Expenses: Not on file   Food Insecurity:     Worried About Running Out of Food in the Last Year: Not on file    Chad of Food in the Last Year: Not on file   Transportation Needs:     Lack of Transportation (Medical): Not on file    Lack of Transportation (Non-Medical): Not on file   Physical Activity:     Days of Exercise per Week: Not on file    Minutes of Exercise per Session: Not on file   Stress:     Feeling of Stress : Not on file   Social Connections:     Frequency of Communication with Friends and Family: Not on file    Frequency of Social Gatherings with Friends and Family: Not on file    Attends Baptism Services: Not on file    Active Member of [address] [place] [address] [employer] or Organizations: Not on file    Attends Club or Organization Meetings: Not on file    Marital Status: Not on file   Intimate Partner Violence:     Fear of Current or Ex-Partner: Not on file    Emotionally Abused: Not on file    Physically Abused: Not on file    Sexually Abused: Not on file   Housing Stability:     Unable to Pay for Housing in the Last Year: Not on file    Number of [place] in the Last Year: Not on file    Unstable Housing in the Last Year: Not on file         ALLERGIES: Patient has no known allergies. Review of Systems   Respiratory: Positive for chest tightness. Cardiovascular: Positive for chest pain.    All other systems reviewed and are negative. Vitals:    01/24/22 1411 01/24/22 1416   BP: (!) 205/92    Pulse: 86    Resp: 20    Temp: 97.8 °F (36.6 °C)    SpO2: 97%    Weight:  149.7 kg (330 lb)            Physical Exam     GENERAL:The patient has Body mass index is 42.37 kg/m². Well-hydrated. VITAL SIGNS: Heart rate, blood pressure, respiratory rate reviewed as recorded in  nurse's notes  EYES: Pupils reactive. Extraocular motion intact. No conjunctival redness or drainage. NECK: Supple, no meningeal signs. Trachea midline. No masses or thyromegaly. LUNGS: Breath sounds clear and equal bilaterally no accessory muscle use. CHEST: No deformity  CARDIOVASCULAR: Regular rate and rhythm  EXTREMITIES: No clubbing or cyanosis. No joint swelling. Normal muscle tone. No  restricted range of motion appreciated. NEUROLOGIC: Sensation is grossly intact. Cranial nerve exam reveals face is  symmetrical, tongue is midline speech is clear. SKIN: No rash or petechiae. Good skin turgor palpated. PSYCHIATRIC: Alert and oriented. Appropriate behavior and judgment.       MDM  Number of Diagnoses or Management Options  Diagnosis management comments: CHF, COPD, pneumonia, PE,    MI, coronary artery disease, unstable angina, coronary artery disease,    Atrial fibrillation, cardiac arrhythmia, PVC, medication induced palpitations, heart block,  electrolyte induced palpitations,    Aortic dissection, aortic aneurysm,    GERD, musculoskeletal pain, costochondritis, rib fracture, pleurisy,         Amount and/or Complexity of Data Reviewed  Clinical lab tests: ordered and reviewed  Tests in the radiology section of CPT®: ordered and reviewed  Tests in the medicine section of CPT®: ordered and reviewed  Decide to obtain previous medical records or to obtain history from someone other than the patient: yes  Obtain history from someone other than the patient: yes  Discuss the patient with other providers: yes  Independent visualization of images, tracings, or specimens: yes      ED Course as of 01/24/22 1659 Mon Jan 24, 2022   1658 Troponin-High Sensitivity(!!): 6,424.2 []   1659 Cardiology was consulted regarding the need for admission to the hospital and they will come and see him here in the ER []      ED Course User Index  [] Jenne Spatz, DO       EKG    Date/Time: 1/24/2022 4:58 PM  Performed by: Jenne Spatz, DO  Authorized by: Jenne Spatz, DO     ECG reviewed by ED Physician in the absence of a cardiologist: yes    Comments:      Paced rhythm at 86 bpm  Critical Care  Performed by: Jenne Spatz, DO  Authorized by: Jenne Spatz, DO     Comments:      Critical care time: 30 minutes of critical care time was performed in the emergency department. This was separate from any other procedures listed during the patients emergency department course. The failure to initiate these interventions on an urgent basis would likely have resulted in sudden, clinically significant or life-threatening deterioration in the patients condition.

## 2022-01-24 NOTE — ED NOTES
TRANSFER - OUT REPORT:    Verbal report given to juan washington(name) on Marycarmen Caballero  being transferred to [facility](unit) for routine progression of care       Report consisted of patients Situation, Background, Assessment and   Recommendations(SBAR). Information from the following report(s) SBAR was reviewed with the receiving nurse. Lines:   Peripheral IV 01/24/22 Posterior;Right Forearm (Active)        Opportunity for questions and clarification was provided.       Patient transported with:   Monitor  Registered Nurse

## 2022-01-24 NOTE — ED TRIAGE NOTES
Pt reports midsternal chest pain that radiates to bilateral arms, onset Friday. States that he feels relief with nitro.

## 2022-01-24 NOTE — ROUTINE PROCESS
TRANSFER - IN REPORT:    Verbal report received from [address] on Lynda Galindo being received from ED for routine progression of care      Report consisted of patients Situation, Background, Assessment and Recommendations(SBAR). Information from the following report(s) SBAR and Kardex was reviewed with the receiving nurse. Opportunity for questions and clarification was provided. Assessment completed upon patients arrival to unit and care assumed.

## 2022-01-24 NOTE — H&P
[facility] Cardiology History & Physical      Date of  Admission: 1/24/2022  4:45 PM     Primary Care Physician: Dr. Kenny Encarnacion  Primary Cardiologist: Dr. Belgica Toledo  Referring Physician: Dr. Loya Friendly [facility] ED)  Admitting Physician: Dr. Christina Chirinos    CC: chest pain    HPI:  Lynda Galindo is a 70 y.o. male morbidly obese WM with h/o tobacco abuse, HTN, dyslipidemia, CAD s/p CABG 2000, AF off [address] after kidney hemorrhage, ICM s/p Medtronic BiV ICD with improved EF in 2017, CKD who hasn't been seen by Dr. Belgica Toledo since 6/2020 and presents to [facility] ED with c/o several days of angina. He states last Thursday 1/21/22 and Friday 1/22/22, he started having worsening chest pressure with radiation to bilateral arms with associated SOB and fatigue. Pain worsened and he presented to [facility] ED on 1/24/22. In ED, HS troponin elevated at 4987.1, CXR unremarkable and ECG shows paced rhythm with underlying AF. Cardiology asked to evaluate for admission. On exam, Pt is CP free at rest. He reports single kidney since bleed years ago. He takes Goodys powders daily for HA but denies h/o GIB, melena, hematuria or BRBPR. Wife states he stopped taking statin, but unsure when or why.       Past Medical History:   Diagnosis Date    Arrhythmia     a fib; s/p ablation; pacer dependent    Arthritis     Atrial fibrillation ([address] 06/2000    CAD (coronary artery disease)     Chest pain 05/2000    Chronic kidney disease     1 kidney    Chronic obstructive pulmonary disease ([address]     Coronary artery disease involving native coronary artery of native heart without angina pectoris 11/7/2017    Dyspnea 3/1/2016    Edema 08/2010    Heart failure ([address]     Hypercholesterolemia     Hypertension     Ischemic cardiomyopathy 3/1/2016    LV dysfunction 06/2000    Morbid obesity ([address]     Other ill-defined conditions(799.89)     MI x2    Pacemaker 07/2009    Thrombus 03/2012    Unstable angina ([address] 09/2000      Past Surgical History:   Procedure Laterality Date    HX BIV-IMPLANTABLE CARDIOVERTER DEFIBRILLATOR      HX CORONARY ARTERY BYPASS GRAFT      HX PACEMAKER      ICD    NM CARDIAC SURG PROCEDURE UNLIST      S/P CABG       No Known Allergies   Social History     Socioeconomic History    Marital status:      Spouse name: Not on file    Number of children: Not on file    Years of education: Not on file    Highest education level: Not on file   Occupational History    Not on file   Tobacco Use    Smoking status: Former Smoker     Packs/day: 1.00     Years: 30.00     Pack years: 30.00     Quit date: 2000     Years since quittin.0    Smokeless tobacco: Current User    Tobacco comment: STOPPED IN    Substance and Sexual Activity    Alcohol use: No    Drug use: No    Sexual activity: Not on file   Other Topics Concern    Not on file   Social History Narrative    Not on file     Social Determinants of Health     Financial Resource Strain:     Difficulty of Paying Living Expenses: Not on file   Food Insecurity:     Worried About [address] [employer] in the Last Year: Not on file    [address] in the Last Year: Not on file   Transportation Needs:     Lack of Transportation (Medical): Not on file    Lack of Transportation (Non-Medical):  Not on file   Physical Activity:     Days of Exercise per Week: Not on file    Minutes of Exercise per Session: Not on file   Stress:     Feeling of Stress : Not on file   Social Connections:     Frequency of Communication with Friends and Family: Not on file    Frequency of Social Gatherings with Friends and Family: Not on file    Attends Congregational Services: Not on file    Active Member of Clubs or Organizations: Not on file    Attends Club or Organization Meetings: Not on file    Marital Status: Not on file   Intimate Partner Violence:     Fear of Current or Ex-Partner: Not on file    Emotionally Abused: Not on file    Physically Abused: Not on file    Sexually Abused: Not on file   Housing Stability:     Unable to Pay for Housing in the Last Year: Not on file    Number of Places Lived in the Last Year: Not on file    Unstable Housing in the Last Year: Not on file     History reviewed. No pertinent family history. Current Facility-Administered Medications   Medication Dose Route Frequency    sodium chloride (NS) flush 5-10 mL  5-10 mL IntraVENous Q8H    sodium chloride (NS) flush 5-10 mL  5-10 mL IntraVENous PRN    nitroglycerin (NITROBID) 2 % ointment 1 Inch  1 Inch Topical NOW    heparin (porcine) 1,000 unit/mL injection 8,980 Units  60 Units/kg IntraVENous ONCE    heparin 25,000 units in dextrose 500 mL infusion  12-25 Units/kg/hr IntraVENous TITRATE     Current Outpatient Medications   Medication Sig    torsemide (DEMADEX) 100 mg tablet TAKE ONE TABLET BY MOUTH DAILY    carvediloL (COREG) 25 mg tablet TAKE 1 TABLET BY MOUTH TWICE DAILY WITH MEALS    spironolactone (ALDACTONE) 25 mg tablet TAKE 1 TABLET BY MOUTH DAILY    lisinopriL (PRINIVIL, ZESTRIL) 10 mg tablet TAKE ONE HALF TABLET BY MOUTH DAILY    isosorbide mononitrate ER (IMDUR) 60 mg CR tablet TAKE 1 TABLET BY MOUTH EVERY MORNING    lisinopriL (PRINIVIL, ZESTRIL) 5 mg tablet Take 1 Tab by mouth daily.  BUDESONIDE/FORMOTEROL FUMARATE (SYMBICORT IN) Take 2 Puffs by inhalation daily.  docusate sodium (COLACE) 50 mg capsule Take 100 mg by mouth daily.  tiotropium (SPIRIVA WITH HANDIHALER) 18 mcg inhalation capsule Take 1 Cap by inhalation daily.  FLUoxetine (PROZAC) 20 mg capsule Take 60 mg by mouth daily.  aspirin delayed-release 81 mg tablet Take 1 Tab by mouth daily.  doxepin (SINEQUAN) 10 mg capsule Take 30 mg by mouth nightly.  POTASSIUM CHLORIDE PO Take 20 mEq by mouth daily.  nitroglycerin (NITROSTAT) 0.4 mg SL tablet by SubLINGual route every five (5) minutes as needed.  traZODone (DESYREL) 150 mg tablet Take 150 mg by mouth nightly.          Review of symptoms:  General: no recent weight loss/gain, weakness,+ fatigue, fever or chills   Skin: no rashes, lumps, or other skin changes   HEENT: no headache, dizziness, lightheadedness, vision changes, hearing changes, tinnitus, vertigo, sinus pressure/pain, bleeding gums, sore throat, or hoarseness   Neck: no swollen glands, goiter, pain or stiffness   Respiratory: no cough, sputum, hemoptysis, dyspnea, wheezing   Cardiovascular: +chest pain or discomfort, palpitations, +dyspnea, orthopnea, paroxysmal nocturnal dyspnea, no peripheral edema   Gastrointestinal: no trouble swallowing, heartburn, change of appetite, nausea, change in bowel habits, pain with defecation, rectal bleeding or black/tarry stools, hemorrhoids, constipation, diarrhea, abdominal pain, jaundice, liver or gallbladder problems   Urinary: no frequency, urgency , hematuria, burning/pain with urination, recent flank pain, polyuria, nocturia, or difficulty urinating   Peripheral Vascular: no claudication, leg cramps, prior DVTs, swelling of calves, legs, or feet, color change, or swelling with redness or tenderness   Musculoskeletal: no muscle or joint pain/stiffness, joint swelling, erythema of joints, or back pain   Psychiatric: no depression, mental disorders, or excessive stress   Neurological: no history of CVA, dizziness, no sensory or motor loss, seizures, syncope, tremors, numbness, tingling, no changes in mood, attention, or speech, no changes in orientation, memory, insight, or judgment. no headache, vertigo.    Hematologic: no anemia, easy bruising or bleeding   Endocrine: no diabetes, thyroid problems, heat or cold intolerance, excessive sweating, polyuria, polydipsia      Subjective:   Physical Exam  Visit Vitals  BP (!) 184/82   Pulse 70   Temp 97.8 °F (36.6 °C)   Resp 20   Ht 6' 2.02\" (1.88 m)   Wt 149.7 kg (330 lb)   SpO2 97%   BMI 42.35 kg/m²     General Appearance:  Well developed, obese, alert and oriented x 3, and individual in no acute distress. Ears/Nose/Mouth/Throat:   Hearing grossly normal.         Neck: Supple. Chest:   Lungs fairly clear to auscultation bilaterally. Cardiovascular:  Regular rate and rhythm, S1, S2    Abdomen:   Soft, non-tender, bowel sounds are active. Extremities: No edema bilaterally. Skin: Warm and dry. Labs:   Recent Results (from the past 24 hour(s))   EKG, 12 LEAD, INITIAL    Collection Time: 01/24/22  2:13 PM   Result Value Ref Range    Ventricular Rate 86 BPM    Atrial Rate 93 BPM    QRS Duration 156 ms    Q-T Interval 414 ms    QTC Calculation (Bezet) 495 ms    Calculated R Axis -66 degrees    Calculated T Axis 130 degrees    Diagnosis       Ventricular-paced rhythm  Biventricular pacemaker detected  Abnormal ECG  When compared with ECG of 13-FEB-2019 13:01,  Vent. rate has increased BY  10 BPM  Confirmed by Janay Floyd MD (), TITI ABRAHAM ([id number]) on 1/24/2022 3:36:25 PM     TROPONIN-HIGH SENSITIVITY    Collection Time: 01/24/22  2:20 PM   Result Value Ref Range    Troponin-High Sensitivity 4,987.1 (HH) 0 - 14 pg/mL   CBC WITH AUTOMATED DIFF    Collection Time: 01/24/22  2:20 PM   Result Value Ref Range    WBC 8.2 4.3 - 11.1 K/uL    RBC 4.73 4.23 - 5.6 M/uL    HGB 14.0 13.6 - 17.2 g/dL    HCT 44.0 41.1 - 50.3 %    MCV 93.0 79.6 - 97.8 FL    MCH 29.6 26.1 - 32.9 PG    MCHC 31.8 31.4 - 35.0 g/dL    RDW 12.7 11.9 - 14.6 %    PLATELET 393 476 - 777 K/uL    MPV 10.2 9.4 - 12.3 FL    ABSOLUTE NRBC 0.00 0.0 - 0.2 K/uL    DF AUTOMATED      NEUTROPHILS 75 43 - 78 %    LYMPHOCYTES 12 (L) 13 - 44 %    MONOCYTES 10 4.0 - 12.0 %    EOSINOPHILS 2 0.5 - 7.8 %    BASOPHILS 1 0.0 - 2.0 %    IMMATURE GRANULOCYTES 0 0.0 - 5.0 %    ABS. NEUTROPHILS 6.1 1.7 - 8.2 K/UL    ABS. LYMPHOCYTES 1.0 0.5 - 4.6 K/UL    ABS. MONOCYTES 0.8 0.1 - 1.3 K/UL    ABS. EOSINOPHILS 0.2 0.0 - 0.8 K/UL    ABS. BASOPHILS 0.1 0.0 - 0.2 K/UL    ABS. IMM.  GRANS. 0.0 0.0 - 0.5 K/UL   METABOLIC PANEL, COMPREHENSIVE    Collection Time: 01/24/22  2:20 PM   Result Value Ref Range    Sodium 140 138 - 145 mmol/L    Potassium 3.5 3.5 - 5.1 mmol/L    Chloride 103 98 - 107 mmol/L    CO2 29 21 - 32 mmol/L    Anion gap 8 7 - 16 mmol/L    Glucose 82 65 - 100 mg/dL    BUN 26 (H) 8 - 23 MG/DL    Creatinine 2.12 (H) 0.8 - 1.5 MG/DL    GFR est AA 40 (L) >60 ml/min/1.73m2    GFR est non-AA 33 (L) >60 ml/min/1.73m2    Calcium 9.8 8.3 - 10.4 MG/DL    Bilirubin, total 0.5 0.2 - 1.1 MG/DL    ALT (SGPT) 26 12 - 65 U/L    AST (SGOT) 42 (H) 15 - 37 U/L    Alk.  phosphatase 114 50 - 136 U/L    Protein, total 7.6 6.3 - 8.2 g/dL    Albumin 3.3 3.2 - 4.6 g/dL    Globulin 4.3 (H) 2.3 - 3.5 g/dL    A-G Ratio 0.8 (L) 1.2 - 3.5     LIPASE    Collection Time: 01/24/22  2:20 PM   Result Value Ref Range    Lipase 78 73 - 393 U/L   MAGNESIUM    Collection Time: 01/24/22  2:20 PM   Result Value Ref Range    Magnesium 2.2 1.8 - 2.4 mg/dL       Pt has been seen and examined by Dr. Cande Macario and he agrees with the following assessment and plan:     Assessment/Plan:          Diagnosis    NSTEMI- admit to telemetry, serial CE's x 3, ASA, BB, statin, Imdur, start heparin, IVF in prep for [facility] 1/25 AM- check echo, interrogate Medtronic device    CAD (oronary artery disease) s/p CABG 2000- continue ASA, Coreg, Imdur, add statin    H/o Ischemic cardiomyopathy s/p BiV ICD- last echo showed preserved EF 11/2017- check echo, interrogate device    Chronic atrial fibrillation- records indicate off [address] for years after severe kidney hemorrhage, needs to be considered for watchman- IV heparin     Essential hypertension- continue home meds, add/titrate as needed    Hyperlipidemia- start statin, check fasting lipid profile in AM, wife states it was stopped at some point    ICD (implantable cardioverter-defibrillator) in place- BiV ICD Medtronic- will have interrogated, last interrogation documented was 2019    H/o kidney hemorrhage- off [address]    CKD- BUN/Cr elevated- IVF, repeat BMP in AM    Morbid obesity- weight loss encouraged     Dr. Xavier Jesus, PA-C

## 2022-01-25 ENCOUNTER — APPOINTMENT (OUTPATIENT)
Dept: NON INVASIVE DIAGNOSTICS | Age: 72
DRG: 281 | End: 2022-01-25
Attending: PHYSICIAN ASSISTANT
Payer: MEDICARE

## 2022-01-25 LAB
ANION GAP SERPL CALC-SCNC: 4 MMOL/L (ref 7–16)
ATRIAL RATE: 70 BPM
BASOPHILS # BLD: 0.1 K/UL (ref 0–0.2)
BASOPHILS NFR BLD: 1 % (ref 0–2)
BUN SERPL-MCNC: 24 MG/DL (ref 8–23)
CALCIUM SERPL-MCNC: 9.4 MG/DL (ref 8.3–10.4)
CALCULATED P AXIS, ECG09: 48 DEGREES
CALCULATED R AXIS, ECG10: -64 DEGREES
CALCULATED T AXIS, ECG11: 26 DEGREES
CHLORIDE SERPL-SCNC: 104 MMOL/L (ref 98–107)
CHOLEST SERPL-MCNC: 199 MG/DL
CO2 SERPL-SCNC: 30 MMOL/L (ref 21–32)
CREAT SERPL-MCNC: 2.06 MG/DL (ref 0.8–1.5)
DIAGNOSIS, 93000: NORMAL
DIFFERENTIAL METHOD BLD: ABNORMAL
ECHO AO ASC DIAM: 4 CM
ECHO AO ASCENDING AORTA INDEX: 1.49 CM/M2
ECHO AO ROOT DIAM: 4.3 CM
ECHO AO ROOT INDEX: 1.6 CM/M2
ECHO AV AREA PEAK VELOCITY: 2.7 CM2
ECHO AV AREA VTI: 2.7 CM2
ECHO AV AREA/BSA PEAK VELOCITY: 1 CM2/M2
ECHO AV AREA/BSA VTI: 1 CM2/M2
ECHO AV CUSP MM: 2.6 CM
ECHO AV MEAN GRADIENT: 2 MMHG
ECHO AV MEAN VELOCITY: 0.7 M/S
ECHO AV PEAK GRADIENT: 5 MMHG
ECHO AV PEAK VELOCITY: 1.1 M/S
ECHO AV VELOCITY RATIO: 0.64
ECHO AV VTI: 23.3 CM
ECHO EST RA PRESSURE: 8 MMHG
ECHO LA AREA 2C: 28.3 CM2
ECHO LA AREA 4C: 27.7 CM2
ECHO LA DIAMETER INDEX: 1.67 CM/M2
ECHO LA DIAMETER: 4.5 CM
ECHO LA MAJOR AXIS: 7.4 CM
ECHO LA MINOR AXIS: 6.7 CM
ECHO LA TO AORTIC ROOT RATIO: 1.05
ECHO LA VOL BP: 94 ML (ref 18–58)
ECHO LA VOL/BSA BIPLANE: 35 ML/M2 (ref 16–34)
ECHO LV E' LATERAL VELOCITY: 11 CM/S
ECHO LV E' SEPTAL VELOCITY: 10 CM/S
ECHO LV EDV A4C: 117 ML
ECHO LV EDV INDEX A4C: 43 ML/M2
ECHO LV EJECTION FRACTION A4C: 62 %
ECHO LV ESV A4C: 44 ML
ECHO LV ESV INDEX A4C: 16 ML/M2
ECHO LV FRACTIONAL SHORTENING: 31 % (ref 28–44)
ECHO LV INTERNAL DIMENSION DIASTOLE INDEX: 2.27 CM/M2
ECHO LV INTERNAL DIMENSION DIASTOLIC: 6.1 CM (ref 4.2–5.9)
ECHO LV INTERNAL DIMENSION SYSTOLIC INDEX: 1.56 CM/M2
ECHO LV INTERNAL DIMENSION SYSTOLIC: 4.2 CM
ECHO LV IVSD: 1.4 CM (ref 0.6–1)
ECHO LV MASS 2D: 398.3 G (ref 88–224)
ECHO LV MASS INDEX 2D: 148.1 G/M2 (ref 49–115)
ECHO LV POSTERIOR WALL DIASTOLIC: 1.4 CM (ref 0.6–1)
ECHO LV RELATIVE WALL THICKNESS RATIO: 0.46
ECHO LVOT AREA: 4.5 CM2
ECHO LVOT AV VTI INDEX: 0.6
ECHO LVOT DIAM: 2.4 CM
ECHO LVOT MEAN GRADIENT: 1 MMHG
ECHO LVOT PEAK GRADIENT: 2 MMHG
ECHO LVOT PEAK VELOCITY: 0.7 M/S
ECHO LVOT STROKE VOLUME INDEX: 23.5 ML/M2
ECHO LVOT SV: 63.3 ML
ECHO LVOT VTI: 14 CM
ECHO MV A VELOCITY: 0.31 M/S
ECHO MV AREA VTI: 2 CM2
ECHO MV E DECELERATION TIME (DT): 187 MS
ECHO MV E VELOCITY: 1.19 M/S
ECHO MV E/A RATIO: 3.84
ECHO MV E/E' LATERAL: 10.82
ECHO MV E/E' RATIO (AVERAGED): 11.36
ECHO MV E/E' SEPTAL: 11.9
ECHO MV LVOT VTI INDEX: 2.25
ECHO MV MAX VELOCITY: 1.6 M/S
ECHO MV MEAN GRADIENT: 3 MMHG
ECHO MV MEAN VELOCITY: 0.7 M/S
ECHO MV PEAK GRADIENT: 11 MMHG
ECHO MV REGURGITANT PEAK GRADIENT: 112 MMHG
ECHO MV REGURGITANT PEAK VELOCITY: 5.3 M/S
ECHO MV VTI: 31.5 CM
ECHO RIGHT VENTRICULAR SYSTOLIC PRESSURE (RVSP): 31 MMHG
ECHO RV BASAL DIMENSION: 3.1 CM
ECHO RV INTERNAL DIMENSION: 2.8 CM
ECHO RV TAPSE: 2.3 CM (ref 1.5–2)
ECHO TV REGURGITANT MAX VELOCITY: 2.39 M/S
ECHO TV REGURGITANT PEAK GRADIENT: 23 MMHG
EOSINOPHIL # BLD: 0.2 K/UL (ref 0–0.8)
EOSINOPHIL NFR BLD: 2 % (ref 0.5–7.8)
ERYTHROCYTE [DISTWIDTH] IN BLOOD BY AUTOMATED COUNT: 12.9 % (ref 11.9–14.6)
GLUCOSE SERPL-MCNC: 104 MG/DL (ref 65–100)
HCT VFR BLD AUTO: 44.7 % (ref 41.1–50.3)
HDLC SERPL-MCNC: 46 MG/DL (ref 40–60)
HDLC SERPL: 4.3 {RATIO}
HGB BLD-MCNC: 14.2 G/DL (ref 13.6–17.2)
IMM GRANULOCYTES # BLD AUTO: 0 K/UL (ref 0–0.5)
IMM GRANULOCYTES NFR BLD AUTO: 0 % (ref 0–5)
LDLC SERPL CALC-MCNC: 130 MG/DL
LYMPHOCYTES # BLD: 0.9 K/UL (ref 0.5–4.6)
LYMPHOCYTES NFR BLD: 11 % (ref 13–44)
MCH RBC QN AUTO: 29.2 PG (ref 26.1–32.9)
MCHC RBC AUTO-ENTMCNC: 31.8 G/DL (ref 31.4–35)
MCV RBC AUTO: 92 FL (ref 79.6–97.8)
MONOCYTES # BLD: 0.6 K/UL (ref 0.1–1.3)
MONOCYTES NFR BLD: 7 % (ref 4–12)
NEUTS SEG # BLD: 6.2 K/UL (ref 1.7–8.2)
NEUTS SEG NFR BLD: 78 % (ref 43–78)
NRBC # BLD: 0 K/UL (ref 0–0.2)
PLATELET # BLD AUTO: 211 K/UL (ref 150–450)
PMV BLD AUTO: 10.5 FL (ref 9.4–12.3)
POTASSIUM SERPL-SCNC: 3.8 MMOL/L (ref 3.5–5.1)
Q-T INTERVAL, ECG07: 436 MS
QRS DURATION, ECG06: 126 MS
QTC CALCULATION (BEZET), ECG08: 470 MS
RBC # BLD AUTO: 4.86 M/UL (ref 4.23–5.6)
SODIUM SERPL-SCNC: 138 MMOL/L (ref 136–145)
TRIGL SERPL-MCNC: 115 MG/DL (ref 35–150)
TROPONIN-HIGH SENSITIVITY: 4671.9 PG/ML (ref 0–14)
UFH PPP CHRO-ACNC: 0.29 IU/ML (ref 0.3–0.7)
UFH PPP CHRO-ACNC: 0.39 IU/ML (ref 0.3–0.7)
VENTRICULAR RATE, ECG03: 70 BPM
VLDLC SERPL CALC-MCNC: 23 MG/DL (ref 6–23)
WBC # BLD AUTO: 8 K/UL (ref 4.3–11.1)

## 2022-01-25 PROCEDURE — C8929 TTE W OR WO FOL WCON,DOPPLER: HCPCS

## 2022-01-25 PROCEDURE — 93459 L HRT ART/GRFT ANGIO: CPT | Performed by: INTERNAL MEDICINE

## 2022-01-25 PROCEDURE — 77030016699 HC CATH ANGI DX INFN1 CARD -A: Performed by: INTERNAL MEDICINE

## 2022-01-25 PROCEDURE — B2111ZZ FLUOROSCOPY OF MULTIPLE CORONARY ARTERIES USING LOW OSMOLAR CONTRAST: ICD-10-PCS | Performed by: INTERNAL MEDICINE

## 2022-01-25 PROCEDURE — 74011250637 HC RX REV CODE- 250/637: Performed by: NURSE PRACTITIONER

## 2022-01-25 PROCEDURE — 74011000250 HC RX REV CODE- 250: Performed by: PHYSICIAN ASSISTANT

## 2022-01-25 PROCEDURE — 77030022513 HC GD NDL ACUSIT CIVC -B: Performed by: INTERNAL MEDICINE

## 2022-01-25 PROCEDURE — 65660000000 HC RM CCU STEPDOWN

## 2022-01-25 PROCEDURE — 85520 HEPARIN ASSAY: CPT

## 2022-01-25 PROCEDURE — 80061 LIPID PANEL: CPT

## 2022-01-25 PROCEDURE — B2121ZZ FLUOROSCOPY OF SINGLE CORONARY ARTERY BYPASS GRAFT USING LOW OSMOLAR CONTRAST: ICD-10-PCS | Performed by: INTERNAL MEDICINE

## 2022-01-25 PROCEDURE — 74011000636 HC RX REV CODE- 636: Performed by: INTERNAL MEDICINE

## 2022-01-25 PROCEDURE — 74011000250 HC RX REV CODE- 250: Performed by: INTERNAL MEDICINE

## 2022-01-25 PROCEDURE — 36415 COLL VENOUS BLD VENIPUNCTURE: CPT

## 2022-01-25 PROCEDURE — C1760 CLOSURE DEV, VASC: HCPCS | Performed by: INTERNAL MEDICINE

## 2022-01-25 PROCEDURE — 74011250637 HC RX REV CODE- 250/637: Performed by: INTERNAL MEDICINE

## 2022-01-25 PROCEDURE — 2709999900 HC NON-CHARGEABLE SUPPLY

## 2022-01-25 PROCEDURE — 74011250636 HC RX REV CODE- 250/636: Performed by: PHYSICIAN ASSISTANT

## 2022-01-25 PROCEDURE — 77030004558 HC CATH ANGI DX SUPR TORQ CARD -A: Performed by: INTERNAL MEDICINE

## 2022-01-25 PROCEDURE — B2181ZZ FLUOROSCOPY OF LEFT INTERNAL MAMMARY BYPASS GRAFT USING LOW OSMOLAR CONTRAST: ICD-10-PCS | Performed by: INTERNAL MEDICINE

## 2022-01-25 PROCEDURE — 99152 MOD SED SAME PHYS/QHP 5/>YRS: CPT | Performed by: INTERNAL MEDICINE

## 2022-01-25 PROCEDURE — 93005 ELECTROCARDIOGRAM TRACING: CPT | Performed by: INTERNAL MEDICINE

## 2022-01-25 PROCEDURE — 74011250637 HC RX REV CODE- 250/637: Performed by: PHYSICIAN ASSISTANT

## 2022-01-25 PROCEDURE — 74011250636 HC RX REV CODE- 250/636: Performed by: INTERNAL MEDICINE

## 2022-01-25 PROCEDURE — 99024 POSTOP FOLLOW-UP VISIT: CPT | Performed by: INTERNAL MEDICINE

## 2022-01-25 PROCEDURE — C1769 GUIDE WIRE: HCPCS | Performed by: INTERNAL MEDICINE

## 2022-01-25 PROCEDURE — 4A023N7 MEASUREMENT OF CARDIAC SAMPLING AND PRESSURE, LEFT HEART, PERCUTANEOUS APPROACH: ICD-10-PCS | Performed by: INTERNAL MEDICINE

## 2022-01-25 PROCEDURE — 84484 ASSAY OF TROPONIN QUANT: CPT

## 2022-01-25 PROCEDURE — C1894 INTRO/SHEATH, NON-LASER: HCPCS | Performed by: INTERNAL MEDICINE

## 2022-01-25 PROCEDURE — 85025 COMPLETE CBC W/AUTO DIFF WBC: CPT

## 2022-01-25 PROCEDURE — 80048 BASIC METABOLIC PNL TOTAL CA: CPT

## 2022-01-25 PROCEDURE — 74011000250 HC RX REV CODE- 250: Performed by: EMERGENCY MEDICINE

## 2022-01-25 PROCEDURE — 99153 MOD SED SAME PHYS/QHP EA: CPT | Performed by: INTERNAL MEDICINE

## 2022-01-25 DEVICE — ANGIO-SEAL VIP VASCULAR CLOSURE DEVICE
Type: IMPLANTABLE DEVICE | Site: LEG | Status: FUNCTIONAL
Brand: ANGIO-SEAL

## 2022-01-25 RX ORDER — SODIUM CHLORIDE 0.9 % (FLUSH) 0.9 %
5-40 SYRINGE (ML) INJECTION EVERY 8 HOURS
Status: DISCONTINUED | OUTPATIENT
Start: 2022-01-25 | End: 2022-01-26 | Stop reason: HOSPADM

## 2022-01-25 RX ORDER — FENTANYL CITRATE 50 UG/ML
INJECTION, SOLUTION INTRAMUSCULAR; INTRAVENOUS AS NEEDED
Status: DISCONTINUED | OUTPATIENT
Start: 2022-01-25 | End: 2022-01-25 | Stop reason: HOSPADM

## 2022-01-25 RX ORDER — SODIUM CHLORIDE 9 MG/ML
250 INJECTION, SOLUTION INTRAVENOUS CONTINUOUS
Status: DISPENSED | OUTPATIENT
Start: 2022-01-25 | End: 2022-01-25

## 2022-01-25 RX ORDER — HYDRALAZINE HYDROCHLORIDE 50 MG/1
50 TABLET, FILM COATED ORAL 3 TIMES DAILY
Status: DISCONTINUED | OUTPATIENT
Start: 2022-01-25 | End: 2022-01-26 | Stop reason: HOSPADM

## 2022-01-25 RX ORDER — MIDAZOLAM HYDROCHLORIDE 1 MG/ML
INJECTION, SOLUTION INTRAMUSCULAR; INTRAVENOUS AS NEEDED
Status: DISCONTINUED | OUTPATIENT
Start: 2022-01-25 | End: 2022-01-25 | Stop reason: HOSPADM

## 2022-01-25 RX ORDER — HEPARIN SODIUM 1000 [USP'U]/ML
20 INJECTION, SOLUTION INTRAVENOUS; SUBCUTANEOUS ONCE
Status: COMPLETED | OUTPATIENT
Start: 2022-01-25 | End: 2022-01-25

## 2022-01-25 RX ORDER — LIDOCAINE HYDROCHLORIDE 10 MG/ML
INJECTION INFILTRATION; PERINEURAL AS NEEDED
Status: DISCONTINUED | OUTPATIENT
Start: 2022-01-25 | End: 2022-01-25 | Stop reason: HOSPADM

## 2022-01-25 RX ORDER — SODIUM CHLORIDE 0.9 % (FLUSH) 0.9 %
5-40 SYRINGE (ML) INJECTION AS NEEDED
Status: DISCONTINUED | OUTPATIENT
Start: 2022-01-25 | End: 2022-01-26 | Stop reason: HOSPADM

## 2022-01-25 RX ORDER — AMLODIPINE BESYLATE 5 MG/1
5 TABLET ORAL DAILY
Status: DISCONTINUED | OUTPATIENT
Start: 2022-01-26 | End: 2022-01-26 | Stop reason: HOSPADM

## 2022-01-25 RX ORDER — ISOSORBIDE MONONITRATE 60 MG/1
60 TABLET, EXTENDED RELEASE ORAL DAILY
Status: DISCONTINUED | OUTPATIENT
Start: 2022-01-26 | End: 2022-01-26 | Stop reason: HOSPADM

## 2022-01-25 RX ORDER — HEPARIN SODIUM 200 [USP'U]/100ML
INJECTION, SOLUTION INTRAVENOUS
Status: COMPLETED | OUTPATIENT
Start: 2022-01-25 | End: 2022-01-25

## 2022-01-25 RX ADMIN — SODIUM CHLORIDE, PRESERVATIVE FREE 5 ML: 5 INJECTION INTRAVENOUS at 21:56

## 2022-01-25 RX ADMIN — CARVEDILOL 25 MG: 25 TABLET, FILM COATED ORAL at 08:07

## 2022-01-25 RX ADMIN — SODIUM CHLORIDE, PRESERVATIVE FREE 10 ML: 5 INJECTION INTRAVENOUS at 14:08

## 2022-01-25 RX ADMIN — DULOXETINE HYDROCHLORIDE 60 MG: 60 CAPSULE, DELAYED RELEASE ORAL at 08:07

## 2022-01-25 RX ADMIN — PERFLUTREN 1 ML: 6.52 INJECTION, SUSPENSION INTRAVENOUS at 08:55

## 2022-01-25 RX ADMIN — HYDRALAZINE HYDROCHLORIDE 50 MG: 50 TABLET, FILM COATED ORAL at 17:26

## 2022-01-25 RX ADMIN — NITROGLYCERIN 1 INCH: 20 OINTMENT TOPICAL at 11:20

## 2022-01-25 RX ADMIN — SODIUM CHLORIDE, PRESERVATIVE FREE 10 ML: 5 INJECTION INTRAVENOUS at 05:39

## 2022-01-25 RX ADMIN — HYDRALAZINE HYDROCHLORIDE 50 MG: 50 TABLET, FILM COATED ORAL at 21:55

## 2022-01-25 RX ADMIN — CARVEDILOL 25 MG: 25 TABLET, FILM COATED ORAL at 17:26

## 2022-01-25 RX ADMIN — NITROGLYCERIN 1 INCH: 20 OINTMENT TOPICAL at 01:15

## 2022-01-25 RX ADMIN — ATORVASTATIN CALCIUM 80 MG: 80 TABLET, FILM COATED ORAL at 21:55

## 2022-01-25 RX ADMIN — NITROGLYCERIN 1 INCH: 20 OINTMENT TOPICAL at 05:39

## 2022-01-25 RX ADMIN — HEPARIN SODIUM 2990 UNITS: 1000 INJECTION INTRAVENOUS; SUBCUTANEOUS at 10:46

## 2022-01-25 RX ADMIN — SODIUM CHLORIDE 250 ML/HR: 900 INJECTION, SOLUTION INTRAVENOUS at 17:49

## 2022-01-25 RX ADMIN — ASPIRIN 81 MG: 81 TABLET ORAL at 08:07

## 2022-01-25 RX ADMIN — DOCUSATE SODIUM 100 MG: 100 CAPSULE ORAL at 08:07

## 2022-01-25 RX ADMIN — DOXEPIN HYDROCHLORIDE 30 MG: 10 CAPSULE ORAL at 21:55

## 2022-01-25 RX ADMIN — HEPARIN SODIUM 14 UNITS/KG/HR: 5000 INJECTION, SOLUTION INTRAVENOUS at 10:50

## 2022-01-25 RX ADMIN — SODIUM CHLORIDE, PRESERVATIVE FREE 10 ML: 5 INJECTION INTRAVENOUS at 17:00

## 2022-01-25 RX ADMIN — SODIUM CHLORIDE 75 ML/HR: 900 INJECTION, SOLUTION INTRAVENOUS at 07:07

## 2022-01-25 NOTE — PROGRESS NOTES
01/24/22 2025   Skin Integumentary   Skin Integumentary (WDL) X    Pressure  Injury Documentation No Pressure Injury Noted-Pressure Ulcer Prevention Initiated   Skin Color Appropriate for ethnicity   Skin Condition/Temp Dry;Fragile   Skin Integrity Tattoos (comment); Other (comment); Scars (comment)  (Moles)   Turgor Epidermis thin w/ loss of subcut tissue   Hair Growth Present   Nails WDL   Varicosities Absent

## 2022-01-25 NOTE — PROGRESS NOTES
TRANSFER - OUT REPORT:    Verbal report given to RN(name) on Laina Monroe  being transferred to [address] [place] [address] (unit) for routine progression of care       Report consisted of patients Situation, Background, Assessment and   Recommendations(SBAR). Information from the following report(s) SBAR was reviewed with the receiving nurse.     [facility] malika Barnes  Diagnostic, no interventions  FRA - closed with 6 fr angioseal    4 mg Versed  75 mcg Fentanyl

## 2022-01-25 NOTE — PROGRESS NOTES
Transfer in report received from [facility], [facility] in Cath lab. Procedure reviewed. R paulain site assessed.

## 2022-01-25 NOTE — PROGRESS NOTES
Pt admitted with NSTEMI. Plan for LHC after hydration. Care Management Interventions  PCP Verified by CM: Yes Camelia Pride)  Last Visit to PCP: 04/07/21  Mode of Transport at Discharge: Other (see comment) (Spouse)  Discharge Durable Medical Equipment: No  Physical Therapy Consult: No  Occupational Therapy Consult: No  Support Systems: Spouse/Significant Other  Confirm Follow Up Transport: Self  Discharge Location  Patient Expects to be Discharged to[de-identified] Home  Chart screened by  for discharge planning. No needs identified at this time. Please consult  if any new issues arise.

## 2022-01-25 NOTE — PROGRESS NOTES
[facility] CARDIOLOGY PROGRESS NOTE           1/25/2022 3:38 PM    Admit Date: 1/24/2022      Subjective:   Cardiac catheterization demonstrates complex three-vessel coronary artery disease with 1 of 4 grafts patent. Right coronary artery is seen filling by collaterals. Small second obtuse marginal and diagonal fill also by collaterals. Left internal mammary artery to the LAD remains patent. Circumflex is patent into the first obtuse marginal and distal circumflex is patent to 2 small posterior lateral branches. Echocardiogram with LVEF 40-45%. Patient has established chronic atrial fibrillation status post AV node ablation and biventricular ICD in place. Blood pressures have been difficult to control. ROS:  Cardiovascular:  As noted above    Objective:      Vitals:    01/25/22 0757 01/25/22 0914 01/25/22 1120 01/25/22 1143   BP: (!) 140/84 113/69 (!) 147/75 (!) 145/87   Pulse: 71  69 69   Resp: 18   20   Temp: 97.7 °F (36.5 °C)   97.8 °F (36.6 °C)   SpO2: 99%   100%   Weight:  330 lb (149.7 kg)     Height:  6' 2\" (1.88 m)         Physical Exam:  General-No Acute Distress  Neck- supple, no JVD  CV- regular rate and rhythm no MRG  Lung- clear bilaterally  Abd- soft, nontender, nondistended  Ext- no edema bilaterally. Skin- warm and dry    Data Review:   Recent Labs     01/25/22  0818 01/24/22  1420    140   K 3.8 3.5   MG  --  2.2   BUN 24* 26*   CREA 2.06* 2.12*   * 82   WBC 8.0 8.2   HGB 14.2 14.0   HCT 44.7 44.0    224   CHOL 199  --    LDLC 130*  --    HDL 46  --        Assessment/Plan:     Principal Problem:    NSTEMI (non-ST elevated myocardial infarction) ([facility] (1/24/2022)  Patient with complex three-vessel coronary disease in 1 of 4 grafts patent. Patient be managed medically. We will try intensify therapy for hypertension to include Imdur and hydralazine.   We will add amlodipine to address blood pressure as well as antianginal.    Active Problems: Chronic atrial fibrillation ([facility] [address] [facility] (4/14/2016)  Patient status post AV node ablation and biventricular pacemaker. He is not anticoagulated due to spontaneous renal hemorrhage in past.  Will need to consider patient for watchman in the future. I am not certain this is an absolute contraindication to anticoagulation and will defer to primary cardiologist.      Essential hypertension (4/14/2016)  See medication changes above      Hyperlipidemia (4/14/2016)  Continue atorvastatin 80 mg daily      ICD (implantable cardioverter-defibrillator) in place (11/7/2017)  Normal function      Coronary artery disease involving native coronary artery of native heart without angina pectoris (11/7/2017)  Please see above      Morbid obesity ([facility] [address] [facility] (1/24/2022)  Remains major comorbidity. Recommend outpatient cardiac rehabilitation to address diet and lifestyle changes.           Nicole Chen MD  1/25/2022 3:38 PM

## 2022-01-25 NOTE — PROGRESS NOTES
TRANSFER - OUT REPORT:    Verbal report given to Myah Peña RN on iDon Navarro  being transferred to telemetry for routine progression of care       Report consisted of patients Situation, Background, Assessment and   Recommendations(SBAR). Information from the following report(s) SBAR was reviewed with the receiving nurse. Lines:   Peripheral IV 01/24/22 Posterior;Right Forearm (Active)   Site Assessment Clean, dry, & intact 01/25/22 1245   Phlebitis Assessment 0 01/25/22 1245   Infiltration Assessment 0 01/25/22 1245   Dressing Status Clean, dry, & intact 01/25/22 1245   Dressing Type Tape;Transparent 01/25/22 1245   Hub Color/Line Status Patent;Pink 01/25/22 1245   Alcohol Cap Used No 01/25/22 0440        Opportunity for questions and clarification was provided.

## 2022-01-25 NOTE — ROUTINE PROCESS
Bedside and Verbal shift change report given to self (oncoming nurse) by Dolores Combs RN (offgoing nurse). Report included the following information SBAR, Kardex, Intake/Output, MAR, Recent Results and Cardiac Rhythm A fib/Paced.

## 2022-01-26 VITALS
TEMPERATURE: 98.8 F | HEART RATE: 69 BPM | HEIGHT: 74 IN | RESPIRATION RATE: 18 BRPM | BODY MASS INDEX: 40.43 KG/M2 | WEIGHT: 315 LBS | SYSTOLIC BLOOD PRESSURE: 123 MMHG | DIASTOLIC BLOOD PRESSURE: 70 MMHG | OXYGEN SATURATION: 97 %

## 2022-01-26 LAB
ANION GAP SERPL CALC-SCNC: 4 MMOL/L (ref 7–16)
BASOPHILS # BLD: 0 K/UL (ref 0–0.2)
BASOPHILS NFR BLD: 0 % (ref 0–2)
BUN SERPL-MCNC: 20 MG/DL (ref 8–23)
CALCIUM SERPL-MCNC: 9.3 MG/DL (ref 8.3–10.4)
CHLORIDE SERPL-SCNC: 104 MMOL/L (ref 98–107)
CO2 SERPL-SCNC: 29 MMOL/L (ref 21–32)
CREAT SERPL-MCNC: 1.89 MG/DL (ref 0.8–1.5)
DIFFERENTIAL METHOD BLD: ABNORMAL
EOSINOPHIL # BLD: 0.1 K/UL (ref 0–0.8)
EOSINOPHIL NFR BLD: 1 % (ref 0.5–7.8)
ERYTHROCYTE [DISTWIDTH] IN BLOOD BY AUTOMATED COUNT: 12.8 % (ref 11.9–14.6)
GLUCOSE SERPL-MCNC: 105 MG/DL (ref 65–100)
HCT VFR BLD AUTO: 40.2 % (ref 41.1–50.3)
HGB BLD-MCNC: 12.8 G/DL (ref 13.6–17.2)
IMM GRANULOCYTES # BLD AUTO: 0 K/UL (ref 0–0.5)
IMM GRANULOCYTES NFR BLD AUTO: 0 % (ref 0–5)
LYMPHOCYTES # BLD: 0.7 K/UL (ref 0.5–4.6)
LYMPHOCYTES NFR BLD: 7 % (ref 13–44)
MCH RBC QN AUTO: 29.6 PG (ref 26.1–32.9)
MCHC RBC AUTO-ENTMCNC: 31.8 G/DL (ref 31.4–35)
MCV RBC AUTO: 93.1 FL (ref 79.6–97.8)
MONOCYTES # BLD: 0.8 K/UL (ref 0.1–1.3)
MONOCYTES NFR BLD: 8 % (ref 4–12)
NEUTS SEG # BLD: 8.2 K/UL (ref 1.7–8.2)
NEUTS SEG NFR BLD: 83 % (ref 43–78)
NRBC # BLD: 0 K/UL (ref 0–0.2)
PLATELET # BLD AUTO: 174 K/UL (ref 150–450)
PMV BLD AUTO: 10.2 FL (ref 9.4–12.3)
POTASSIUM SERPL-SCNC: 3.7 MMOL/L (ref 3.5–5.1)
RBC # BLD AUTO: 4.32 M/UL (ref 4.23–5.6)
SODIUM SERPL-SCNC: 137 MMOL/L (ref 138–145)
WBC # BLD AUTO: 9.9 K/UL (ref 4.3–11.1)

## 2022-01-26 PROCEDURE — 74011250637 HC RX REV CODE- 250/637: Performed by: PHYSICIAN ASSISTANT

## 2022-01-26 PROCEDURE — 74011250637 HC RX REV CODE- 250/637: Performed by: INTERNAL MEDICINE

## 2022-01-26 PROCEDURE — 85025 COMPLETE CBC W/AUTO DIFF WBC: CPT

## 2022-01-26 PROCEDURE — 80048 BASIC METABOLIC PNL TOTAL CA: CPT

## 2022-01-26 PROCEDURE — 99238 HOSP IP/OBS DSCHRG MGMT 30/<: CPT | Performed by: INTERNAL MEDICINE

## 2022-01-26 PROCEDURE — 74011000250 HC RX REV CODE- 250: Performed by: PHYSICIAN ASSISTANT

## 2022-01-26 RX ORDER — MAG HYDROX/ALUMINUM HYD/SIMETH 200-200-20
30 SUSPENSION, ORAL (FINAL DOSE FORM) ORAL
Status: DISCONTINUED | OUTPATIENT
Start: 2022-01-26 | End: 2022-01-26

## 2022-01-26 RX ORDER — HYDRALAZINE HYDROCHLORIDE 50 MG/1
50 TABLET, FILM COATED ORAL 3 TIMES DAILY
Qty: 90 TABLET | Refills: 3 | Status: SHIPPED | OUTPATIENT
Start: 2022-01-26 | End: 2022-03-07

## 2022-01-26 RX ORDER — ACETAMINOPHEN 325 MG/1
650 TABLET ORAL
Status: DISCONTINUED | OUTPATIENT
Start: 2022-01-26 | End: 2022-01-26

## 2022-01-26 RX ORDER — CLOPIDOGREL BISULFATE 75 MG/1
75 TABLET ORAL DAILY
Status: DISCONTINUED | OUTPATIENT
Start: 2022-01-26 | End: 2022-01-26 | Stop reason: HOSPADM

## 2022-01-26 RX ORDER — CLOPIDOGREL BISULFATE 75 MG/1
75 TABLET ORAL DAILY
Qty: 30 TABLET | Refills: 11 | Status: SHIPPED | OUTPATIENT
Start: 2022-01-26

## 2022-01-26 RX ORDER — ATORVASTATIN CALCIUM 80 MG/1
80 TABLET, FILM COATED ORAL
Qty: 30 TABLET | Refills: 11 | Status: SHIPPED | OUTPATIENT
Start: 2022-01-26

## 2022-01-26 RX ORDER — AMLODIPINE BESYLATE 5 MG/1
5 TABLET ORAL DAILY
Qty: 30 TABLET | Refills: 3 | Status: SHIPPED | OUTPATIENT
Start: 2022-01-26 | End: 2022-03-07 | Stop reason: ALTCHOICE

## 2022-01-26 RX ADMIN — SODIUM CHLORIDE, PRESERVATIVE FREE 10 ML: 5 INJECTION INTRAVENOUS at 04:43

## 2022-01-26 RX ADMIN — DOCUSATE SODIUM 100 MG: 100 CAPSULE ORAL at 07:57

## 2022-01-26 RX ADMIN — CARVEDILOL 25 MG: 25 TABLET, FILM COATED ORAL at 07:55

## 2022-01-26 RX ADMIN — ISOSORBIDE MONONITRATE 60 MG: 60 TABLET, EXTENDED RELEASE ORAL at 07:56

## 2022-01-26 RX ADMIN — ASPIRIN 81 MG: 81 TABLET ORAL at 07:56

## 2022-01-26 RX ADMIN — CLOPIDOGREL BISULFATE 75 MG: 75 TABLET ORAL at 09:08

## 2022-01-26 RX ADMIN — ACETAMINOPHEN 650 MG: 325 TABLET ORAL at 07:56

## 2022-01-26 RX ADMIN — DULOXETINE HYDROCHLORIDE 60 MG: 60 CAPSULE, DELAYED RELEASE ORAL at 07:57

## 2022-01-26 RX ADMIN — AMLODIPINE BESYLATE 5 MG: 5 TABLET ORAL at 07:56

## 2022-01-26 NOTE — ROUTINE PROCESS
Cardiac Rehab: Spoke with patient regarding referral to cardiac rehab. Patient meets admission criteria based on NSTEMI (1/24/22). Written information about Cardiac Rehab given and reviewed with patient. Discussed lifestyle modifications to promote cardiac wellness. Pt states he has participated in the Cardiac Rehab program in the past at the [place] [address] which is closer to his home in [facility]. Patient indicated that he wants to repeat the cardiac rehab program at [facility]. We will forward his referral as requested. His Cardiologist is Dr. Terrence Basilio.       Thank you,  Koby Isaac, RAMAKRISHNAN, RN  Cardiopulmonary Rehabilitation Nurse Liaison  [facility]

## 2022-01-26 NOTE — ROUTINE PROCESS
Bedside and Verbal shift change report given to Daryl Clancy RN (oncoming nurse) by self (offgoing nurse). Report included the following information SBAR, Kardex, Procedure Summary, Intake/Output, MAR, Recent Results and Cardiac Rhythm V paced.

## 2022-01-26 NOTE — PROGRESS NOTES
Problem: Falls - Risk of  Goal: *Absence of Falls  Description: Document Darlen [place] Fall Risk and appropriate interventions in the flowsheet.   Outcome: Progressing Towards Goal  Note: Fall Risk Interventions:            Medication Interventions: Teach patient to arise slowly

## 2022-01-26 NOTE — DISCHARGE SUMMARY
[address] Cardiology Discharge Summary     Patient ID:  Selene Bates  [id number]  70 y.o.  1950    Admit date: 1/24/2022    Discharge date and time:  1-    Admitting Physician: Nakia Copeland MD     Discharge Physician: Kitty Galan PA-C/ [address] [facility]    Admission Diagnoses: NSTEMI (non-ST elevated myocardial infarction) [facility]) [I21.4]    Discharge Diagnoses:   Patient Active Problem List    Diagnosis Date Noted    NSTEMI (non-ST elevated myocardial infarction) ([facility] 01/24/2022    Morbid obesity ([facility] 01/24/2022    Coronary artery disease with stable angina pectoris ([facility] 06/09/2020    ICD (implantable cardioverter-defibrillator) in place 11/07/2017    Coronary artery disease involving native coronary artery of native heart without angina pectoris 11/07/2017    Chronic atrial fibrillation ([facility] 04/14/2016    Essential hypertension 04/14/2016    Hyperlipidemia 04/14/2016    Dyspnea 03/01/2016    Ischemic cardiomyopathy 03/01/2016       Cardiology Procedures this admission:  Diagnostic left heart catheterization  EchoCardiogram  Consults: None    Hospital Course: Pt is a 70 y.o. morbidly obese WM with h/o tobacco abuse, HTN, dyslipidemia, CAD s/p CABG 2000, AF s/p AVN ablation off [address] after kidney hemorrhage, ICM s/p [employer] BiV ICD with improved EF in 2017, CKD who hasn't been seen by Dr. Felisha Villagomez since 6/2020 and presented to [facility] ED on 1/24/22 with c/o several days of angina. He states last Thursday 1/21/22 and Friday 1/22/22, he started having worsening chest pressure with radiation to bilateral arms with associated SOB and fatigue. In ED, HS troponin elevated at 4987.1 and went up to 5203.5-4671.9, CXR unremarkable and ECG shows paced rhythm with underlying AF. He was admitted with plan for echo and LHC. Pt treated with IVF in prep for LHC. Echo showed EF 40-45%.  Pt was taken to the cath lab by Dr. Corinda Randy which showed complex three-vessel coronary artery disease with 1 of 4 grafts patent. RCA filling by collaterals, small second obtuse marginal and diagonal fill also by collaterals. Left internal mammary artery to the LAD remains patent. Circumflex is patent into the first obtuse marginal and distal circumflex is patent to 2 small posterior lateral branches. Patient has established chronic atrial fibrillation status post AV node ablation and biventricular ICD in place. Blood pressures have been difficult to control and Hydralazine and Norvasc added with improvement in BP. Pt tolerated the procedure well and was taken to the telemetry floor for recovery. The following morning Pt was up feeling well without any complaints of CP, SOB or palpitations. Pt's right radial cath site was clean, dry and intact without hematoma or bruit. Pt's labs were improved with BUN/Cr 20/1.89 down from admission 26/2. 12. Pt was seen and examined by Dr. Salinas Garcia and determined stable and ready for discharge. Pt was instructed on the importance of taking aspirin and plavix everyday without missing a dose. With NSTEMI and CAD, Pt will continue BB, CCB, hydralazine and statin. No ACE-I/ARB with CKD. Pt will need to be considered for Watchman at follow up. DISPOSITION: The patient is being discharged home in stable condition on a low saturated fat, low cholesterol and low salt diet. Pt is instructed to advance activities as tolerated limited to fatigue or shortness of breath. Pt is instructed to do no heavy lifting, straining, stooping or squatting for 5 days. Pt is instructed to watch cath site for bleeding/oozing, if seen Pt is instructed to apply firm pressure with clean cloth and call office at [phone]. Pt is instructed to watch for signs of infection which include increasing area of redness around site, fever/hot to touch or purulent drainage. Pt is instructed not to soak in a tub bath for 1 week, but it is okay to shower.  Pt is instructed to call office or return to ER for immediate evaluation of any shortness of breath or chest pain not relieved by NTG. Follow up with [facility] Cardiology Dr. Deborah Rodriguez for TC 7 on Monday 1/31 at 3:30 pm [facility] office  Pt is being referred to out patient cardiac rehab. Discharge Exam:   Visit Vitals  /70 (BP 1 Location: Left upper arm, BP Patient Position: Sitting)   Pulse 69   Temp 98.8 °F (37.1 °C)   Resp 18   Ht 6' 2\" (1.88 m)   Wt 149.2 kg (329 lb)   SpO2 97%   BMI 42.24 kg/m²   [facility] see his progress note for exam details.     Recent Results (from the past 24 hour(s))   ECHO ADULT COMPLETE    Collection Time: 01/25/22  9:00 AM   Result Value Ref Range    LA Minor Axis 6.7 cm    LA Major Axis 7.4 cm    LA Area 2C 28.3 cm2    LA Area 4C 27.7 cm2    LA Volume BP 94 (A) 18 - 58 mL    LA Diameter 4.5 cm    LV EDV A4C 117 mL    LV ESV A4C 44 mL    IVSd 1.4 (A) 0.6 - 1.0 cm    LVIDd 6.1 (A) 4.2 - 5.9 cm    LVIDs 4.2 cm    LVOT Diameter 2.4 cm    LVOT Mean Gradient 1 mmHg    LVOT VTI 14.0 cm    LVOT Peak Velocity 0.7 m/s    LVOT Peak Gradient 2 mmHg    LVPWd 1.4 (A) 0.6 - 1.0 cm    LV E' Lateral Velocity 11 cm/s    LV E' Septal Velocity 10 cm/s    LV Ejection Fraction A4C 62 %    LVOT Area 4.5 cm2    LVOT SV 63.3 ml    AV Cusp Mmode 2.6 cm    AV Mean Gradient 2 mmHg    AV VTI 23.3 cm    AV Mean Velocity 0.7 m/s    AV Peak Velocity 1.1 m/s    AV Peak Gradient 5 mmHg    AV Area by VTI 2.7 cm2    AV Area by Peak Velocity 2.7 cm2    Aortic Root 4.3 cm    Ascending Aorta 4.0 cm    MR Peak Velocity 5.3 m/s    MR Peak Gradient 112 mmHg    MV Max Velocity 1.6 m/s    MV Peak Gradient 11 mmHg    MV E Wave Deceleration Time 187.0 ms    MV A Velocity 0.31 m/s    MV E Velocity 1.19 m/s    MV Mean Gradient 3 mmHg    MV VTI 31.5 cm    MV Mean Velocity 0.7 m/s    MV Area by VTI 2.0 cm2    RVIDd 2.8 cm    RV Basal Dimension 3.1 cm    TAPSE 2.3 (A) 1.5 - 2.0 cm    TR Max Velocity 2.39 m/s    TR Peak Gradient 23 mmHg    Fractional Shortening 2D 31 28 - 44 %    LV ESV Index A4C 16 mL/m2    LV EDV Index A4C 43 mL/m2    LVIDd Index 2.27 cm/m2    LVIDs Index 1.56 cm/m2    LV RWT Ratio 0.46     LV Mass 2D 398.3 (A) 88 - 224 g    LV Mass 2D Index 148.1 (A) 49 - 115 g/m2    MV E/A 3.84     E/E' Ratio (Averaged) 11.36     E/E' Lateral 10.82     E/E' Septal 11.90     LA Volume Index BP 35 (A) 16 - 34 ml/m2    LVOT Stroke Volume Index 23.5 mL/m2    LA Size Index 1.67 cm/m2    LA/AO Root Ratio 1.05     Ao Root Index 1.60 cm/m2    Ascending Aorta Index 1.49 cm/m2    AV Velocity Ratio 0.64     LVOT:AV VTI Index 0.60     MARIE/BSA VTI 1.0 cm2/m2    MARIE/BSA Peak Velocity 1.0 cm2/m2    MV:LVOT VTI Index 2.25     Est. RA Pressure 8 mmHg    RVSP 31 mmHg   METABOLIC PANEL, BASIC    Collection Time: 01/26/22  4:42 AM   Result Value Ref Range    Sodium 137 (L) 138 - 145 mmol/L    Potassium 3.7 3.5 - 5.1 mmol/L    Chloride 104 98 - 107 mmol/L    CO2 29 21 - 32 mmol/L    Anion gap 4 (L) 7 - 16 mmol/L    Glucose 105 (H) 65 - 100 mg/dL    BUN 20 8 - 23 MG/DL    Creatinine 1.89 (H) 0.8 - 1.5 MG/DL    GFR est AA 45 (L) >60 ml/min/1.73m2    GFR est non-AA 38 (L) >60 ml/min/1.73m2    Calcium 9.3 8.3 - 10.4 MG/DL   CBC WITH AUTOMATED DIFF    Collection Time: 01/26/22  4:42 AM   Result Value Ref Range    WBC 9.9 4.3 - 11.1 K/uL    RBC 4.32 4.23 - 5.6 M/uL    HGB 12.8 (L) 13.6 - 17.2 g/dL    HCT 40.2 (L) 41.1 - 50.3 %    MCV 93.1 79.6 - 97.8 FL    MCH 29.6 26.1 - 32.9 PG    MCHC 31.8 31.4 - 35.0 g/dL    RDW 12.8 11.9 - 14.6 %    PLATELET 474 462 - 686 K/uL    MPV 10.2 9.4 - 12.3 FL    ABSOLUTE NRBC 0.00 0.0 - 0.2 K/uL    DF AUTOMATED      NEUTROPHILS 83 (H) 43 - 78 %    LYMPHOCYTES 7 (L) 13 - 44 %    MONOCYTES 8 4.0 - 12.0 %    EOSINOPHILS 1 0.5 - 7.8 %    BASOPHILS 0 0.0 - 2.0 %    IMMATURE GRANULOCYTES 0 0.0 - 5.0 %    ABS. NEUTROPHILS 8.2 1.7 - 8.2 K/UL    ABS. LYMPHOCYTES 0.7 0.5 - 4.6 K/UL    ABS. MONOCYTES 0.8 0.1 - 1.3 K/UL    ABS. EOSINOPHILS 0.1 0.0 - 0.8 K/UL    ABS.  BASOPHILS 0.0 0.0 - 0.2 K/UL ABS. IMM. GRANS. 0.0 0.0 - 0.5 K/UL         Patient Instructions:   Current Discharge Medication List      START taking these medications    Details   atorvastatin (LIPITOR) 80 mg tablet Take 1 Tablet by mouth nightly. Qty: 30 Tablet, Refills: 11      hydrALAZINE (APRESOLINE) 50 mg tablet Take 1 Tablet by mouth three (3) times daily. Qty: 90 Tablet, Refills: 3      amLODIPine (NORVASC) 5 mg tablet Take 1 Tablet by mouth daily. Qty: 30 Tablet, Refills: 3      clopidogreL (PLAVIX) 75 mg tab Take 1 Tablet by mouth daily. Qty: 30 Tablet, Refills: 11         CONTINUE these medications which have NOT CHANGED    Details   DULoxetine (CYMBALTA) 60 mg capsule Take 60 mg by mouth nightly. torsemide (DEMADEX) 100 mg tablet TAKE ONE TABLET BY MOUTH DAILY  Qty: 30 Tablet, Refills: 0    Associated Diagnoses: Dyspnea on exertion; Essential hypertension      carvediloL (COREG) 25 mg tablet TAKE 1 TABLET BY MOUTH TWICE DAILY WITH MEALS  Qty: 60 Tablet, Refills: 5    Associated Diagnoses: Chronic atrial fibrillation ([address] Dyspnea on exertion; Ischemic cardiomyopathy; Essential hypertension; ICD (implantable cardioverter-defibrillator) in place; Coronary artery disease involving native coronary artery of native heart without angina pectoris      isosorbide mononitrate ER (IMDUR) 60 mg CR tablet TAKE 1 TABLET BY MOUTH EVERY MORNING  Qty: 30 Tab, Refills: 11    Associated Diagnoses: Dyspnea on exertion; Ischemic cardiomyopathy      docusate sodium (COLACE) 50 mg capsule Take 100 mg by mouth nightly. aspirin delayed-release 81 mg tablet Take 1 Tab by mouth daily. Qty: 30 Tab, Refills: 0      doxepin (SINEQUAN) 10 mg capsule Take 30 mg by mouth nightly. potassium chloride SR (K-TAB) 20 mEq tablet Take 40 mEq by mouth daily. nitroglycerin (NITROSTAT) 0.4 mg SL tablet by SubLINGual route every five (5) minutes as needed.            STOP taking these medications       ASA/acetaminophen/caffeine/pot (GOODY'S HEADACHE POWDER PO) Comments:   Reason for Stopping: increased bleeding risk with ASA/Plavix              Signed:  Jayla Desir PA-C  1/26/2022  8:58 AM

## 2022-01-26 NOTE — PROGRESS NOTES
Discharge instructions reviewed with patient. Prescriptions given for lipitor, hydralazine, norvasc, plavix and med info sheets provided for all new medications. Opportunity for questions provided. Patient voiced understanding of all discharge instructions. IV and cardiac monitor removed.

## 2022-01-26 NOTE — ROUTINE PROCESS
Bedside and Verbal shift change report given to self (oncoming nurse) by  Tanner Hodge RN (offgoing nurse). Report included the following information SBAR, Kardex, Intake/Output, MAR, and Recent Results.

## 2022-01-26 NOTE — DISCHARGE INSTRUCTIONS
Cardiac Catheterization/Angiography Discharge Instructions    *Check the puncture site frequently for swelling or bleeding. If you see any bleeding, lie down and apply pressure over the area with a clean town or washcloth. Notify your doctor for any redness, swelling, drainage or oozing from the puncture site. Notify your doctor for any fever or chills. *If the leg or arm with the puncture becomes cold, numb or painful, call Dr Felisha Villagomez at  [phone]    *Activity should be limited for the next 48 hours. Climb stairs as little as possible and avoid any stooping, bending or strenuous activity for 48 hours. No heavy lifting (anything over 10 pounds) for three days. *Do not drive for 48 hours. *You may resume your usual diet. Drink more fluids than usual.    *Have a responsible person drive you home and stay with you for at least 24 hours after your heart catheterization/angiography. *You may remove the bandage from your Right and Arm in 24 hours. You may shower in 24 hours. No tub baths, hot tubs or swimming for one week. Do not place any lotions, creams, powders, ointments over the puncture site for one week. You may place a clean band-aid over the puncture site each day for 5 days. Change this daily. Patient Education   Amlodipine/Atorvastatin (By mouth)   Amlodipine Besylate (cj-UVM-uoAllison BES-i-late), Atorvastatin Calcium (a-tor-va-STAT-in ELISSA-see-um)  Treats high blood pressure and angina (chest pain), and lowers high cholesterol levels. Helps prevent chest pain, stroke, heart attack, or heart and blood vessel diseases. This medicine contains a calcium channel blocker (CCB) and a statin. Brand Name(s): Caduet   There may be other brand names for this medicine. When This Medicine Should Not Be Used: This medicine is not right for everyone. Do not use it if you had an allergic reaction to amlodipine or atorvastatin, or if you are pregnant or breastfeeding.   How to Use This Medicine:   Tablet  · Take your medicine as directed. Your dose may need to be changed several times to find what works best for you. · Take this medicine at the same time each day. · Swallow the tablet whole. Do not break, crush, or chew it. · Read and follow the patient instructions that come with this medicine. Talk to your doctor or pharmacist if you have any questions. · Missed dose: Take it as soon as you can. If more than 12 hours have passed since you missed your dose, skip the missed dose and take your next scheduled dose. Do not take extra medicine to make up for a missed dose. · Store the medicine in a closed container at room temperature, away from heat, moisture, and direct light. Drugs and Foods to Avoid:   Ask your doctor or pharmacist before using any other medicine, including over-the-counter medicines, vitamins, and herbal products. · Some foods and medicines can affect how amlodipine/atorvastatin works. Tell your doctor if you are using any of the following:   ¨ Birth control pills  ¨ Boceprevir, colchicine, cyclosporine, digoxin, diltiazem, fenofibrate, gemfibrozil, niacin (vitamin B3), rifampin, spironolactone, tacrolimus, telaprevir  ¨ Medicine to treat an infection (such as clarithromycin, erythromycin, itraconazole, ketoconazole)  ¨ Medicine to treat HIV/AIDS (such as darunavir, efavirenz, fosamprenavir, lopinavir, nelfinavir, ritonavir, saquinavir, tipranavir)  · Tell your doctor if you regularly drink grapefruit juice or eat grapefruit. Warnings While Using This Medicine:   · It is not safe to take this medicine during pregnancy. It could harm an unborn baby. Tell your doctor right away if you become pregnant. · Tell your doctor if you have kidney disease, liver disease, diabetes, heart or blood vessel disease, heart valve problems, thyroid problems, or a history of stroke.   · This medicine may cause the following problems:  ¨ Muscle damage, which can lead to kidney problems  ¨ Liver problems  ¨ Worsening of angina, increased risk of heart attack or stroke  · This medicine could lower your blood pressure too much, especially when you first use it or if you are dehydrated. Stand or sit up slowly if you feel lightheaded or dizzy. · Do not stop taking this medicine without asking your doctor, even if you feel well. This medicine will not cure high blood pressure, but it will help keep it in a normal range. You may need to take blood pressure medicine for the rest of your life. · Tell your doctor if you usually have more than 2 drinks of alcohol per day. · Your doctor will do lab tests at regular visits to check on the effects of this medicine. Keep all appointments. · Keep all medicine out of the reach of children. Never share your medicine with anyone. Possible Side Effects While Using This Medicine:   Call your doctor right away if you notice any of these side effects:  · Allergic reaction: Itching or hives, swelling in your face or hands, swelling or tingling in your mouth or throat, chest tightness, trouble breathing  · Blistering, peeling, or red skin rash  · Change in how much or how often you urinate, painful urination  · Chest pain that may spread, trouble breathing, nausea, unusual sweating, fainting  · Dark urine or pale stools, nausea, vomiting, loss of appetite, stomach pain, yellow skin or eyes  · Fast, pounding, or uneven heartbeat  · Lightheadedness, dizziness, or fainting  · Muscle pain, tenderness, or weakness  · Rapid weight gain, swelling in your hands, ankles, or feet  · Unexplained fever, unusual tiredness  If you notice these less serious side effects, talk with your doctor:   · Diarrhea, upset stomach  · Joint pain  If you notice other side effects that you think are caused by this medicine, tell your doctor. Call your doctor for medical advice about side effects.  You may report side effects to FDA at 3-466-YFE-9982  © 2017 [facility] Information is for End User's use only and may not be sold, redistributed or otherwise used for commercial purposes. The above information is an  only. It is not intended as medical advice for individual conditions or treatments. Talk to your doctor, nurse or pharmacist before following any medical regimen to see if it is safe and effective for you. Patient Education   Atorvastatin (By mouth)   Atorvastatin (a-tor-va-STAT-in)  Treats high cholesterol and triglyceride levels. Reduces the risk of angina, stroke, heart attack, or certain heart and blood vessel problems. This medicine is a statin. Brand Name(s): Lipitor   There may be other brand names for this medicine. When This Medicine Should Not Be Used: This medicine is not right for everyone. Do not use it if you had an allergic reaction to atorvastatin, if you have active liver disease, or if you are pregnant or breastfeeding. How to Use This Medicine:   Tablet  · Take your medicine as directed. Your dose may need to be changed several times to find what works best for you. · Take this medicine at the same time each day. · Swallow the tablet whole. Do not break it. · Missed dose: Take a dose as soon as you remember. If it is less than 12 hours until your next dose, skip the missed dose and take the next dose at the regular time. Do not take 2 doses of this medicine within 12 hours. · Read and follow the patient instructions that come with this medicine. Talk to your doctor or pharmacist if you have any questions. · Store the medicine in a closed container at room temperature, away from heat, moisture, and direct light. Drugs and Foods to Avoid:   Ask your doctor or pharmacist before using any other medicine, including over-the-counter medicines, vitamins, and herbal products. · Some medicines can affect how atorvastatin works.  Tell your doctor if you also use birth control pills, boceprevir, cimetidine, colchicine, cyclosporine, digoxin, niacin, rifampin, spironolactone, telaprevir, medicine to treat an infection, or medicine to treat HIV/AIDS. Warnings While Using This Medicine:   · It is not safe to take this medicine during pregnancy. It could harm an unborn baby. Tell your doctor right away if you become pregnant. · Tell your doctor if you have kidney disease, diabetes, muscle pain or weakness, thyroid problems, have recently had a stroke or TIA (transient ischemic attack), or have a history of liver disease. Tell your doctor if you drink grapefruit juice or drink alcohol regularly. · This medicine can cause muscle problems, which can lead to kidney problems. · Tell any doctor or dentist who treats you that you use this medicine. You may need to stop using it if you have surgery, have an injury, or develop serious health problems. · Your doctor will do lab tests at regular visits to check on the effects of this medicine. Keep all appointments. · Keep all medicine out of the reach of children. Never share your medicine with anyone. Possible Side Effects While Using This Medicine:   Call your doctor right away if you notice any of these side effects:  · Allergic reaction: Itching or hives, swelling in your face or hands, swelling or tingling in your mouth or throat, chest tightness, trouble breathing  · Blistering, peeling, red skin rash  · Change in how much or how often you urinate  · Dark urine or pale stools, nausea, vomiting, loss of appetite, stomach pain, yellow skin or eyes  · Fever  · Muscle pain, tenderness, or weakness  · Unusual tiredness  If you notice these less serious side effects, talk with your doctor:   · Diarrhea  · Joint pain  If you notice other side effects that you think are caused by this medicine, tell your doctor. Call your doctor for medical advice about side effects.  You may report side effects to FDA at 8-118-AIQ-6546  © 2017 [facility] Information is for End User's use only and may not be sold, redistributed or otherwise used for commercial purposes. The above information is an  only. It is not intended as medical advice for individual conditions or treatments. Talk to your doctor, nurse or pharmacist before following any medical regimen to see if it is safe and effective for you. Patient Education   Clopidogrel (By mouth)   Clopidogrel (jnsw-CCF-mc-grel)  Helps prevent stroke, heart attack, and other heart problems. This medicine is a platelet inhibitor. Brand Name(s): Plavix   There may be other brand names for this medicine. When This Medicine Should Not Be Used: This medicine is not right for everyone. Do not use it if you had an allergic reaction to clopidogrel. How to Use This Medicine:   Tablet  · Your doctor will tell you how much medicine to use. Do not use more than directed. · This medicine should come with a Medication Guide. Ask your pharmacist for a copy if you do not have one. · Missed dose: Take a dose as soon as you remember. If it is almost time for your next dose, wait until then and take a regular dose. Do not take extra medicine to make up for a missed dose. · Store the medicine in a closed container at room temperature, away from heat, moisture, and direct light. Drugs and Foods to Avoid:   Ask your doctor or pharmacist before using any other medicine, including over-the-counter medicines, vitamins, and herbal products. · Some medicines can affect how clopidogrel works. Tell your doctor if you are using any of the following:   ¨ Esomeprazole, omeprazole, repaglinide, or warfarin  ¨ Medicine to treat depression  ¨ NSAID pain or arthritis medicine (including celecoxib, diclofenac, ibuprofen, or naproxen)  Warnings While Using This Medicine:   · Tell your doctor if you are pregnant or breastfeeding, or if you have bleeding problems, stomach ulcer or bleeding, a recent stroke, or have a history of bleeding problems.   · This medicine can cause you to bleed and bruise more easily. Take precautions to avoid injury. Brush and floss your teeth gently, do not play rough sports, and be careful with sharp objects. Severe bleeding can be life-threatening. · This medicine may cause a rare but serious blood clotting condition called thrombotic thrombocytopenic purpura. · Make sure any doctor or dentist who treats you knows that you are using this medicine. Tell your doctor if you plan to have surgery or a dental procedure. · Do not stop using this medicine suddenly. Your doctor will need to slowly decrease your dose before you stop it completely. · Your doctor will check your progress and the effects of this medicine at regular visits. Keep all appointments. · Keep all medicine out of the reach of children. Never share your medicine with anyone. Possible Side Effects While Using This Medicine:   Call your doctor right away if you notice any of these side effects:  · Allergic reaction: Itching or hives, swelling in your face or hands, swelling or tingling in your mouth or throat, chest tightness, trouble breathing  · Bloody or black, tarry stools  · Nosebleeds  · Pinpoint red or purple spots on your skin or in your mouth  · Problems with vision, speech, or walking  · Red or dark brown urine  · Seizures  · Severe stomach pain  · Trouble breathing, tiredness, fast heartbeat, yellow skin or eyes  · Unusual bleeding, bruising, or weakness  · Vomiting of blood or vomit that looks like coffee grounds  If you notice other side effects that you think are caused by this medicine, tell your doctor. Call your doctor for medical advice about side effects. You may report side effects to FDA at 0-245-IOQ-7925  © 2017 [facility] Information is for End User's use only and may not be sold, redistributed or otherwise used for commercial purposes. The above information is an  only.  It is not intended as medical advice for individual conditions or treatments. Talk to your doctor, nurse or pharmacist before following any medical regimen to see if it is safe and effective for you. Patient Education   Hydralazine (By injection)   Hydralazine (dwc-CKOS-s-zeen)  Treats high blood pressure. Brand Name(s): hydrALAZINE HCl Novaplus   There may be other brand names for this medicine. When This Medicine Should Not Be Used: This medicine is not right for everyone. You should not receive it if you had an allergic reaction to hydralazine, or if you have coronary artery disease or rheumatic heart disease. How to Use This Medicine:   Injectable  · Your doctor will prescribe your exact dose and tell you how often it should be given. This medicine is given as a shot into a muscle or into a vein. · A nurse or other health provider will give you this medicine. · Your doctor will give you a few doses of this medicine until your condition improves, and then switch you to an oral medicine that works the same way. If you have any concerns about this, talk to your doctor. Drugs and Foods to Avoid:   Ask your doctor or pharmacist before using any other medicine, including over-the-counter medicines, vitamins, and herbal products. · Some foods and medicines can affect how hydralazine works. Tell your doctor if you are using diazoxide or an MAO inhibitor. Warnings While Using This Medicine:   · Tell your doctor if you are pregnant or breastfeeding, or if you have kidney disease, heart or blood vessel disease, heart rhythm problems, lupus, or if you had a heart attack or stroke. · This medicine may cause the following problems:  ¨ Lupus-like syndrome  ¨ Changes in heart rhythm  ¨ Nerve problems  · This medicine may lower your blood pressure too much and cause you to feel dizzy. Do not drive or do anything else that could be dangerous until you know how this medicine affects you.   · Your doctor will do lab tests at regular visits to check on the effects of this medicine. Keep all appointments. Possible Side Effects While Using This Medicine:   Call your doctor right away if you notice any of these side effects:  · Allergic reaction: Itching or hives, swelling in your face or hands, swelling or tingling in your mouth or throat, chest tightness, trouble breathing  · Change in how much or how often you urinate  · Chest pain that may spread to your arms, jaw, back, or neck, trouble breathing, unusual sweating, faintness  · Fast, pounding, or uneven heartbeat  · Fever, chills, cough, sore throat, and body aches  · Lightheadedness, dizziness, or fainting  · Numbness, tingling, or burning pain in your hands, arms, legs, or feet  · Unusual bleeding, bruising, or weakness  If you notice these less serious side effects, talk with your doctor:   · Diarrhea, nausea, vomiting, loss of appetite  · Headache  · Redness, pain, swelling, itching, blistering, or rash where the shot was given  · Stuffy nose or watery eyes  If you notice other side effects that you think are caused by this medicine, tell your doctor. Call your doctor for medical advice about side effects. You may report side effects to FDA at 8-522-FDA-6825  © 2017 [facility] Information is for End User's use only and may not be sold, redistributed or otherwise used for commercial purposes. The above information is an  only. It is not intended as medical advice for individual conditions or treatments. Talk to your doctor, nurse or pharmacist before following any medical regimen to see if it is safe and effective for you.

## 2022-01-26 NOTE — PROGRESS NOTES
am  1/26/2022 6:32 AM    Admit Date: 1/24/2022    Admit Diagnosis: NSTEMI (non-ST elevated myocardial infarction) [facility]) [I21.4]      Subjective:    Patient : Mr. Gal Bermudez is a 69 yo male with a PMH of ischemic cardiomyopathy, ICD, CAD, and HTN presented to the ED on 1/24/22 complaining of chest pain. Pain was radiating down his arms. He states he has never had a stent placed but had a quad bypass done in 2000.  5 years ago he was taken off of his blood thinners except for a baby aspirin daily because of a renal infarct. Troponin was 5203.5 on 1/24/22. Cardiac catheterization on 1/25/22 demonstrates complex three-vessel coronary artery disease with 1 of 4 grafts patent. Right coronary artery is seen filling by collaterals. Small second obtuse marginal and diagonal fill also by collaterals. Left internal mammary artery to the LAD remains patent. Circumflex is patent into the first obtuse marginal and distal circumflex is patent to 2 small posterior lateral branches. Echocardiogram with LVEF 40-45%. Patient has established chronic atrial fibrillation status post AV node ablation and biventricular ICD in place. Blood pressures have been difficult to control. Objective:      Visit Vitals  /69 (BP 1 Location: Left upper arm, BP Patient Position: Supine)   Pulse 70   Temp 99.5 °F (37.5 °C)   Resp 18   Ht 6' 2\" (1.88 m)   Wt 329 lb (149.2 kg)   SpO2 96%   BMI 42.24 kg/m²       ROS:  General ROS: negative for - chills  Hematological and Lymphatic ROS: negative for - blood clots or jaundice  Respiratory ROS: positive for - shortness of breath  Cardiovascular ROS: positive for - intermittent chest pain over night  Gastrointestinal ROS: no abdominal pain, change in bowel habits, or black or bloody stools  Neurological ROS: no TIA or stroke symptoms    Physical Exam:      Physical Examination: General appearance - Appearance: alert, well appearing, and in no distress and chronically ill appearing.    Neck/lymph - supple, no significant adenopathy  Chest/CV - clear to auscultation, no wheezes, rales or rhonchi, symmetric air entry  Heart - normal rate, regular rhythm, normal S1, S2, no murmurs, rubs, clicks or gallops  Abdomen/GI - soft, nontender, nondistended, no masses or organomegaly   Musculoskeletal - no joint tenderness, deformity or swelling  Extremities - peripheral pulses normal, no pedal edema, no clubbing or cyanosis, not examined  Skin - normal coloration and turgor, no rashes, no suspicious skin lesions noted    Current Facility-Administered Medications   Medication Dose Route Frequency    alum-mag hydroxide-simeth (MYLANTA) oral suspension 30 mL  30 mL Oral Q4H PRN    isosorbide mononitrate ER (IMDUR) tablet 60 mg  60 mg Oral DAILY    hydrALAZINE (APRESOLINE) tablet 50 mg  50 mg Oral TID    sodium chloride (NS) flush 5-40 mL  5-40 mL IntraVENous Q8H    sodium chloride (NS) flush 5-40 mL  5-40 mL IntraVENous PRN    amLODIPine (NORVASC) tablet 5 mg  5 mg Oral DAILY    sodium chloride (NS) flush 5-10 mL  5-10 mL IntraVENous Q8H    sodium chloride (NS) flush 5-10 mL  5-10 mL IntraVENous PRN    aspirin delayed-release tablet 81 mg  81 mg Oral DAILY    carvediloL (COREG) tablet 25 mg  25 mg Oral BID WITH MEALS    docusate sodium (COLACE) capsule 100 mg  100 mg Oral DAILY    doxepin (SINEquan) capsule 30 mg  30 mg Oral QHS    DULoxetine (CYMBALTA) capsule 60 mg  60 mg Oral DAILY    nitroglycerin (NITROSTAT) tablet 0.4 mg  0.4 mg SubLINGual Q5MIN PRN    sodium chloride (NS) flush 5-40 mL  5-40 mL IntraVENous Q8H    sodium chloride (NS) flush 5-40 mL  5-40 mL IntraVENous PRN    morphine injection 2 mg  2 mg IntraVENous Q4H PRN    ondansetron (ZOFRAN) injection 4 mg  4 mg IntraVENous Q4H PRN    atorvastatin (LIPITOR) tablet 80 mg  80 mg Oral QHS    influenza vaccine 2021-22 (6 mos+)(PF) (FLUARIX/FLULAVAL/FLUZONE QUAD) injection 0.5 mL  1 Each IntraMUSCular PRIOR TO DISCHARGE       Data Review: data included in this note has been independently reviewed by the author   BMP:   Lab Results   Component Value Date/Time     (L) 01/26/2022 04:42 AM    K 3.7 01/26/2022 04:42 AM     01/26/2022 04:42 AM    CO2 29 01/26/2022 04:42 AM    AGAP 4 (L) 01/26/2022 04:42 AM     (H) 01/26/2022 04:42 AM    BUN 20 01/26/2022 04:42 AM    CREA 1.89 (H) 01/26/2022 04:42 AM    GFRAA 45 (L) 01/26/2022 04:42 AM    GFRNA 38 (L) 01/26/2022 04:42 AM        TELEMETRY:     Assessment/Plan:     Principal Problem:    NSTEMI (non-ST elevated myocardial infarction) ([facility] [address] (1/24/2022)  Patient with complex three-vessel coronary disease in 1 of 4 grafts patent. Patient be managed medically. We will try intensify therapy for hypertension to include Imdur and hydralazine. We will add amlodipine to address blood pressure as well as antianginal.     Active Problems:    Chronic atrial fibrillation (HCC) (4/14/2016)  Patient status post AV node ablation and biventricular pacemaker. He is not anticoagulated due to spontaneous renal hemorrhage in past.  Will need to consider patient for watchman in the future. I am not certain this is an absolute contraindication to anticoagulation and will defer to primary cardiologist.       Essential hypertension (4/14/2016)  See medication changes above       Hyperlipidemia (4/14/2016)  Continue atorvastatin 80 mg daily       ICD (implantable cardioverter-defibrillator) in place (11/7/2017)  Normal function       Coronary artery disease involving native coronary artery of native heart without angina pectoris (11/7/2017)  Please see above       Morbid obesity ([facility] [address] (1/24/2022)  Remains major comorbidity.   Recommend outpatient cardiac rehabilitation to address diet and lifestyle changes.          Denice Zamora

## 2022-01-26 NOTE — PROGRESS NOTES
Care Management Interventions  PCP Verified by CM: Yes Annette Gonzalez)  Last Visit to PCP: 04/07/21  Mode of Transport at Discharge: Other (see comment) (Spouse)  Discharge Durable Medical Equipment: No  Physical Therapy Consult: No  Occupational Therapy Consult: No  Support Systems: Spouse/Significant Other  Confirm Follow Up Transport: Self  Discharge Location  Patient Expects to be Discharged to[de-identified] Home   Per notes, pt agreeable to participate in cardiac rehab in [facility].

## 2022-01-26 NOTE — PROGRESS NOTES
Problem: Falls - Risk of  Goal: *Absence of Falls  Description: Document [place] Fall Risk and appropriate interventions in the flowsheet.   Outcome: Resolved/Met     Problem: Patient Education: Go to Patient Education Activity  Goal: Patient/Family Education  Outcome: Resolved/Met     Problem: Cath Lab Procedures: Post-Cath Day of Procedure (Initiate SCIP Measures for Post-Op Care)  Goal: Activity/Safety  Outcome: Resolved/Met  Goal: Medications  Outcome: Resolved/Met

## 2022-01-31 PROBLEM — N18.32 STAGE 3B CHRONIC KIDNEY DISEASE (HCC): Status: ACTIVE | Noted: 2022-01-31

## 2022-03-16 PROBLEM — I25.760 CORONARY ARTERY DISEASE INVOLVING BYPASS GRAFT OF TRANSPLANTED HEART WITH UNSTABLE ANGINA PECTORIS (HCC): Status: ACTIVE | Noted: 2022-03-16

## 2022-03-18 PROBLEM — N18.32 STAGE 3B CHRONIC KIDNEY DISEASE (HCC): Status: ACTIVE | Noted: 2022-01-31

## 2022-03-18 PROBLEM — I25.118 CORONARY ARTERY DISEASE WITH STABLE ANGINA PECTORIS (HCC): Status: ACTIVE | Noted: 2020-06-09

## 2022-03-19 PROBLEM — Z95.810 ICD (IMPLANTABLE CARDIOVERTER-DEFIBRILLATOR) IN PLACE: Status: ACTIVE | Noted: 2017-11-07

## 2022-03-19 PROBLEM — I21.4 NSTEMI (NON-ST ELEVATED MYOCARDIAL INFARCTION) (HCC): Status: ACTIVE | Noted: 2022-01-24

## 2022-03-19 PROBLEM — I25.10 CORONARY ARTERY DISEASE INVOLVING NATIVE CORONARY ARTERY OF NATIVE HEART WITHOUT ANGINA PECTORIS: Status: ACTIVE | Noted: 2017-11-07

## 2022-03-20 PROBLEM — E66.01 MORBID OBESITY (HCC): Status: ACTIVE | Noted: 2022-01-24

## 2022-03-24 PROBLEM — I25.760 CORONARY ARTERY DISEASE INVOLVING BYPASS GRAFT OF TRANSPLANTED HEART WITH UNSTABLE ANGINA PECTORIS (HCC): Status: ACTIVE | Noted: 2022-03-16

## 2022-06-06 RX ORDER — CLOPIDOGREL BISULFATE 75 MG/1
75 TABLET ORAL DAILY
Qty: 90 TABLET | Refills: 3 | Status: SHIPPED | OUTPATIENT
Start: 2022-06-06

## 2022-08-31 RX ORDER — CARVEDILOL 25 MG/1
TABLET ORAL
Qty: 60 TABLET | Refills: 5 | Status: SHIPPED | OUTPATIENT
Start: 2022-08-31

## 2022-10-03 RX ORDER — RANOLAZINE 500 MG/1
TABLET, EXTENDED RELEASE ORAL
Qty: 180 TABLET | Refills: 3 | Status: SHIPPED | OUTPATIENT
Start: 2022-10-03

## 2022-10-26 ENCOUNTER — OFFICE VISIT (OUTPATIENT)
Dept: CARDIOLOGY CLINIC | Age: 72
End: 2022-10-26
Payer: COMMERCIAL

## 2022-10-26 VITALS
SYSTOLIC BLOOD PRESSURE: 138 MMHG | WEIGHT: 306.9 LBS | HEIGHT: 74 IN | HEART RATE: 72 BPM | BODY MASS INDEX: 39.39 KG/M2 | DIASTOLIC BLOOD PRESSURE: 72 MMHG

## 2022-10-26 DIAGNOSIS — I25.708 CORONARY ARTERY DISEASE OF BYPASS GRAFT OF NATIVE HEART WITH STABLE ANGINA PECTORIS (HCC): ICD-10-CM

## 2022-10-26 DIAGNOSIS — Z95.810 ICD (IMPLANTABLE CARDIOVERTER-DEFIBRILLATOR) IN PLACE: ICD-10-CM

## 2022-10-26 DIAGNOSIS — I25.5 ISCHEMIC CARDIOMYOPATHY: ICD-10-CM

## 2022-10-26 DIAGNOSIS — I48.20 CHRONIC ATRIAL FIBRILLATION (HCC): Primary | ICD-10-CM

## 2022-10-26 PROCEDURE — 3074F SYST BP LT 130 MM HG: CPT | Performed by: INTERNAL MEDICINE

## 2022-10-26 PROCEDURE — 3078F DIAST BP <80 MM HG: CPT | Performed by: INTERNAL MEDICINE

## 2022-10-26 PROCEDURE — 99213 OFFICE O/P EST LOW 20 MIN: CPT | Performed by: INTERNAL MEDICINE

## 2022-10-26 PROCEDURE — 1123F ACP DISCUSS/DSCN MKR DOCD: CPT | Performed by: INTERNAL MEDICINE

## 2022-10-26 ASSESSMENT — ENCOUNTER SYMPTOMS
ABDOMINAL PAIN: 0
HEMATEMESIS: 0
SPUTUM PRODUCTION: 0
BOWEL INCONTINENCE: 0
DIARRHEA: 0
BLURRED VISION: 0
HOARSE VOICE: 0
COLOR CHANGE: 0
WHEEZING: 0
ORTHOPNEA: 0
SHORTNESS OF BREATH: 0
HEMATOCHEZIA: 0

## 2022-10-26 NOTE — PROGRESS NOTES
[facility] [place] CARDIOLOGY  [address] [place] [address], [address] [facility], [address] [place] [address]  PHONE: [phone]        10/26/22        NAME:  Laury Dash  : 1950  MRN: [id number]       SUBJECTIVE:   Laury Dash is a 67 y.o. male seen for a follow up visit regarding the following: The patient has CAD,Ischemic cardiomyopathy with ICD,HFrEF,and primary hypertension. He returns for device clinic interrogation and follow up. Recently,he reports more domestic stress and rare resultant angina episodes. Chief Complaint   Patient presents with    Cardiomyopathy       HPI:    Coronary Artery Disease  Presents for follow-up visit. Symptoms include chest pain (rare stable angina). Pertinent negatives include no dizziness, leg swelling, muscle weakness, palpitations, shortness of breath or weight gain. The symptoms have been stable. Compliance with diet is good. Compliance with exercise is variable. Compliance with medications is good. Past Medical History, Past Surgical History, Family history, Social History, and Medications were all reviewed with the patient today and updated as necessary.          Current Outpatient Medications:     ranolazine (RANEXA) 500 MG extended release tablet, TAKE ONE TABLET BY MOUTH TWO (2) TIMES A DAY., Disp: 180 tablet, Rfl: 3    carvedilol (COREG) 25 MG tablet, TAKE 1 TABLET BY MOUTH TWICE DAILY WITH MEALS, Disp: 60 tablet, Rfl: 5    clopidogrel (PLAVIX) 75 MG tablet, Take 1 tablet by mouth daily, Disp: 90 tablet, Rfl: 3    aspirin 81 MG EC tablet, Take 81 mg by mouth daily, Disp: , Rfl:     atorvastatin (LIPITOR) 80 MG tablet, Take 80 mg by mouth, Disp: , Rfl:     docusate sodium (COLACE) 50 MG capsule, Take 100 mg by mouth, Disp: , Rfl:     doxepin (SINEQUAN) 10 MG capsule, Take 30 mg by mouth, Disp: , Rfl:     DULoxetine (CYMBALTA) 60 MG extended release capsule, Take 60 mg by mouth, Disp: , Rfl:     hydrALAZINE (APRESOLINE) 50 MG tablet, Take 25 mg by mouth 2 times daily, Disp: , Rfl: isosorbide mononitrate (IMDUR) 120 MG extended release tablet, Take 120 mg by mouth, Disp: , Rfl:     nitroGLYCERIN (NITROSTAT) 0.4 MG SL tablet, Place 0.4 mg under the tongue, Disp: , Rfl:     potassium chloride (KLOR-CON M) 20 MEQ extended release tablet, Take 40 mEq by mouth daily, Disp: , Rfl:     torsemide (DEMADEX) 100 MG tablet, Take 100 mg by mouth 2 times daily, Disp: , Rfl:   No Known Allergies  Past Medical History:   Diagnosis Date    Arrhythmia     a fib; s/p ablation; pacer dependent    Arthritis     Atrial fibrillation ([facility] 2000    CAD (coronary artery disease)     Chest pain 2000    Chronic kidney disease     1 kidney    Chronic obstructive pulmonary disease (HCC)     Coronary artery disease involving native coronary artery of native heart without angina pectoris 2017    Dyspnea 3/1/2016    Edema 2010    Heart failure ([address]     Hypercholesterolemia     Hypertension     Ischemic cardiomyopathy 3/1/2016    LV dysfunction 2000    Morbid obesity ([facility]     Other ill-defined conditions(799.89)     MI x2    Pacemaker 2009    Thrombus 2012    Unstable angina ([facility] 2000     Past Surgical History:   Procedure Laterality Date    CARDIAC DEFIBRILLATOR PLACEMENT      CORONARY ARTERY BYPASS GRAFT      PACEMAKER      ICD    VT CARDIAC SURG PROCEDURE UNLIST      S/P CABG     History reviewed. No pertinent family history. Social History     Tobacco Use    Smoking status: Former     Packs/day: 1.00     Types: Cigarettes     Quit date: 2000     Years since quittin.8    Smokeless tobacco: Current    Tobacco comments:     Quit smoking: STOPPED IN    Substance Use Topics    Alcohol use: No       ROS:    Review of Systems   Constitutional: Negative for chills, decreased appetite, diaphoresis, fever, malaise/fatigue and weight gain. HENT:  Negative for congestion, hearing loss, hoarse voice and nosebleeds. Eyes:  Negative for blurred vision.    Cardiovascular:  Positive for chest pain (rare stable angina) and dyspnea on exertion. Negative for claudication, cyanosis, irregular heartbeat, leg swelling, near-syncope, orthopnea, palpitations, paroxysmal nocturnal dyspnea and syncope. Respiratory:  Negative for shortness of breath, sputum production and wheezing. Endocrine: Negative for polydipsia, polyphagia and polyuria. Skin:  Negative for color change. Musculoskeletal:  Negative for muscle weakness. Gastrointestinal:  Negative for abdominal pain, bowel incontinence, diarrhea, hematemesis and hematochezia. Genitourinary:  Negative for dysuria, frequency and hematuria. Neurological:  Negative for dizziness, focal weakness, light-headedness, loss of balance, numbness, sensory change and weakness. Psychiatric/Behavioral:  Negative for altered mental status and memory loss. PHYSICAL EXAM:   /72   Pulse 72   Ht 6' 2\" (1.88 m)   Wt (!) 306 lb 14.4 oz (139.2 kg)   BMI 39.40 kg/m²      Physical Exam  Constitutional:       Appearance: Normal appearance. He is obese. HENT:      Head: Normocephalic and atraumatic. Nose: Nose normal.   Eyes:      Extraocular Movements: Extraocular movements intact. Pupils: Pupils are equal, round, and reactive to light. Neck:      Vascular: No carotid bruit. Cardiovascular:      Rate and Rhythm: Regular rhythm. Pulses: Normal pulses. Heart sounds: No murmur heard. Pulmonary:      Effort: Pulmonary effort is normal.      Breath sounds: Normal breath sounds. Abdominal:      General: Abdomen is flat. Bowel sounds are normal.      Palpations: Abdomen is soft. Musculoskeletal:         General: Normal range of motion. Cervical back: Normal range of motion and neck supple. Skin:     General: Skin is warm and dry. Neurological:      General: No focal deficit present. Mental Status: He is alert and oriented to person, place, and time.    Psychiatric:         Mood and Affect: Mood normal. Medical problems and test results were reviewed with the patient today. No results found for this or any previous visit (from the past 672 hour(s)). Lab Results   Component Value Date/Time    CHOL 199 01/25/2022 08:18 AM    HDL 46 01/25/2022 08:18 AM     No results found for any visits on 10/26/22. ASSESSMENT and Melo Lindsey was seen today for cardiomyopathy. Diagnoses and all orders for this visit:    Coronary artery disease of bypass graft of native heart with stable angina pectoris (HCC):Stable. Continue Ranexa,Imdur,Coreg,ASA,and Lipitor. ICD (implantable cardioverter-defibrillator) in place:Stable function. Device clinic as scheduled. Ischemic cardiomyopathy:Stable HF symptoms. Continue Coreg,Imdur,and Apresoline. Disposition:    Return in about 6 months (around 4/26/2023).                 Sara Breen MD  10/26/2022  5:24 PM

## 2022-10-28 ENCOUNTER — TELEPHONE (OUTPATIENT)
Dept: CARDIOLOGY CLINIC | Age: 72
End: 2022-10-28

## 2022-10-28 NOTE — TELEPHONE ENCOUNTER
Angelo Pacheco pt wife is calling she needs to talk with a nurse ,She is wanting to know what the date was of pt heart attack. Please call [phone]

## 2022-11-15 DIAGNOSIS — I25.5 ISCHEMIC CARDIOMYOPATHY: ICD-10-CM

## 2022-11-15 DIAGNOSIS — Z95.810 ICD (IMPLANTABLE CARDIOVERTER-DEFIBRILLATOR) IN PLACE: Primary | ICD-10-CM

## 2022-12-06 RX ORDER — HYDRALAZINE HYDROCHLORIDE 50 MG/1
TABLET, FILM COATED ORAL
Qty: 90 TABLET | Refills: 3 | Status: SHIPPED | OUTPATIENT
Start: 2022-12-06

## 2023-01-09 RX ORDER — TORSEMIDE 100 MG/1
TABLET ORAL
Qty: 180 TABLET | Refills: 3 | Status: SHIPPED | OUTPATIENT
Start: 2023-01-09 | End: 2023-01-13 | Stop reason: SDUPTHER

## 2023-01-11 ENCOUNTER — TELEPHONE (OUTPATIENT)
Dept: CARDIOLOGY CLINIC | Age: 73
End: 2023-01-11

## 2023-01-11 NOTE — TELEPHONE ENCOUNTER
Saw  yesterday and he feels SOB and pain in chest caused by blood clot and pneumonia. He had ultrasound of legs this am but no sign of a clot. Wife just wants a fu w/ just due to CHF. He has edema all the time. Appt.made for Friday.  is currently testing and seeing pt. as well. Wife advised to have pt.head to the ER PRN and v/u.

## 2023-01-11 NOTE — TELEPHONE ENCOUNTER
Pt's wife called on behalf of the pt who is asleep in the room with her. States pt has had worsening SOB for about a week. Also states pt has sharp pain when he breathes deeply and is currently experiencing this pain. States pt had an ultrasound of his leg this morning which did not find any blood clots. Pt is still waiting to hear back from Dr. Violet Eduardo. Pt's wife is worried about CHF. Pt also experiencing dizziness and some nausea. States he feels bad all over.

## 2023-01-13 ENCOUNTER — OFFICE VISIT (OUTPATIENT)
Dept: CARDIOLOGY CLINIC | Age: 73
End: 2023-01-13
Payer: COMMERCIAL

## 2023-01-13 VITALS
DIASTOLIC BLOOD PRESSURE: 81 MMHG | BODY MASS INDEX: 38.94 KG/M2 | WEIGHT: 303.4 LBS | HEART RATE: 90 BPM | HEIGHT: 74 IN | SYSTOLIC BLOOD PRESSURE: 138 MMHG

## 2023-01-13 DIAGNOSIS — Z95.810 ICD (IMPLANTABLE CARDIOVERTER-DEFIBRILLATOR) IN PLACE: ICD-10-CM

## 2023-01-13 DIAGNOSIS — N18.4 CHRONIC RENAL FAILURE, STAGE 4 (SEVERE) (HCC): ICD-10-CM

## 2023-01-13 DIAGNOSIS — I25.5 ISCHEMIC CARDIOMYOPATHY: ICD-10-CM

## 2023-01-13 DIAGNOSIS — I50.20 HFREF (HEART FAILURE WITH REDUCED EJECTION FRACTION) (HCC): ICD-10-CM

## 2023-01-13 DIAGNOSIS — I48.20 CHRONIC ATRIAL FIBRILLATION (HCC): ICD-10-CM

## 2023-01-13 DIAGNOSIS — I25.708 CORONARY ARTERY DISEASE OF BYPASS GRAFT OF NATIVE HEART WITH STABLE ANGINA PECTORIS (HCC): Primary | ICD-10-CM

## 2023-01-13 DIAGNOSIS — I10 ESSENTIAL HYPERTENSION: ICD-10-CM

## 2023-01-13 PROCEDURE — 3079F DIAST BP 80-89 MM HG: CPT | Performed by: INTERNAL MEDICINE

## 2023-01-13 PROCEDURE — 3075F SYST BP GE 130 - 139MM HG: CPT | Performed by: INTERNAL MEDICINE

## 2023-01-13 PROCEDURE — 99214 OFFICE O/P EST MOD 30 MIN: CPT | Performed by: INTERNAL MEDICINE

## 2023-01-13 PROCEDURE — 1123F ACP DISCUSS/DSCN MKR DOCD: CPT | Performed by: INTERNAL MEDICINE

## 2023-01-13 RX ORDER — AMOXICILLIN AND CLAVULANATE POTASSIUM 875; 125 MG/1; MG/1
TABLET, FILM COATED ORAL
COMMUNITY
Start: 2023-01-10

## 2023-01-13 RX ORDER — ATORVASTATIN CALCIUM 80 MG/1
80 TABLET, FILM COATED ORAL DAILY
Qty: 90 TABLET | Refills: 3 | Status: SHIPPED | OUTPATIENT
Start: 2023-01-13

## 2023-01-13 RX ORDER — TORSEMIDE 100 MG/1
TABLET ORAL
Qty: 180 TABLET | Refills: 3 | Status: SHIPPED | OUTPATIENT
Start: 2023-01-13

## 2023-01-13 RX ORDER — CARVEDILOL 25 MG/1
TABLET ORAL
Qty: 180 TABLET | Refills: 3 | Status: SHIPPED | OUTPATIENT
Start: 2023-01-13

## 2023-01-13 RX ORDER — METOLAZONE 5 MG/1
5 TABLET ORAL DAILY
Qty: 90 TABLET | Refills: 3 | Status: SHIPPED | OUTPATIENT
Start: 2023-01-13

## 2023-01-13 RX ORDER — CLOPIDOGREL BISULFATE 75 MG/1
75 TABLET ORAL DAILY
Qty: 90 TABLET | Refills: 3 | Status: SHIPPED | OUTPATIENT
Start: 2023-01-13

## 2023-01-13 RX ORDER — RANOLAZINE 500 MG/1
TABLET, EXTENDED RELEASE ORAL
Qty: 180 TABLET | Refills: 3 | Status: SHIPPED | OUTPATIENT
Start: 2023-01-13

## 2023-01-13 RX ORDER — HYDRALAZINE HYDROCHLORIDE 50 MG/1
TABLET, FILM COATED ORAL
Qty: 90 TABLET | Refills: 3 | Status: SHIPPED | OUTPATIENT
Start: 2023-01-13

## 2023-01-13 RX ORDER — CLONAZEPAM 1 MG/1
1 TABLET ORAL 2 TIMES DAILY PRN
COMMUNITY

## 2023-01-13 RX ORDER — ISOSORBIDE MONONITRATE 120 MG/1
120 TABLET, EXTENDED RELEASE ORAL DAILY
Qty: 90 TABLET | Refills: 3 | Status: SHIPPED | OUTPATIENT
Start: 2023-01-13

## 2023-01-13 RX ORDER — ALBUTEROL SULFATE 90 UG/1
2 AEROSOL, METERED RESPIRATORY (INHALATION) EVERY 6 HOURS PRN
COMMUNITY

## 2023-01-13 ASSESSMENT — ENCOUNTER SYMPTOMS
HEMATEMESIS: 0
HOARSE VOICE: 0
SHORTNESS OF BREATH: 0
BLURRED VISION: 0
HEMATOCHEZIA: 0
DIARRHEA: 0
WHEEZING: 0
SPUTUM PRODUCTION: 0
BOWEL INCONTINENCE: 0
ORTHOPNEA: 0
COLOR CHANGE: 0
ABDOMINAL PAIN: 0

## 2023-01-13 NOTE — PROGRESS NOTES
[facility] CARDIOLOGY  [address], [address] [employer]  PHONE: [phone]        23        NAME:  Sahara Stoll  : 1950  MRN: [id number]       SUBJECTIVE:   Sahara Stoll is a 67 y.o. male seen for a follow up visit regarding the following: The patient has a hx of CAD with ischemic cardiomyopathy,ICD,primary hypertension,CRF(Stage 4),and chronic HFrEF. He returns for follow up. He reports worsening SOB and edema. No chief complaint on file. HPI:    Congestive Heart Failure  Presents for follow-up visit. Associated symptoms include chest pain, edema and fatigue. Pertinent negatives include no abdominal pain, chest pressure, claudication, muscle weakness, near-syncope, nocturia, orthopnea, palpitations, paroxysmal nocturnal dyspnea, shortness of breath or unexpected weight change. The symptoms have been worsening. Past Medical History, Past Surgical History, Family history, Social History, and Medications were all reviewed with the patient today and updated as necessary. Current Outpatient Medications:     albuterol sulfate HFA (VENTOLIN HFA) 108 (90 Base) MCG/ACT inhaler, Inhale 2 puffs into the lungs every 6 hours as needed for Wheezing, Disp: , Rfl:     clonazePAM (KLONOPIN) 1 MG tablet, Take 1 mg by mouth 2 times daily as needed. , Disp: , Rfl:     fluticasone-umeclidin-vilant (TRELEGY ELLIPTA) 100-62.5-25 MCG/ACT AEPB inhaler, Inhale into the lungs, Disp: , Rfl:     amoxicillin-clavulanate (AUGMENTIN) 875-125 MG per tablet, TAKE ONE TABLET TWICE A DAILY, Disp: , Rfl:     torsemide (DEMADEX) 100 MG tablet, TAKE ONE TABLET BY MOUTH TWO (2) TIMES A DAY., Disp: 180 tablet, Rfl: 3    hydrALAZINE (APRESOLINE) 50 MG tablet, TAKE 0.5 TABLET BY MOUTH TWO (2) TIMES A DAY., Disp: 90 tablet, Rfl: 3    ranolazine (RANEXA) 500 MG extended release tablet, TAKE ONE TABLET BY MOUTH TWO (2) TIMES A DAY., Disp: 180 tablet, Rfl: 3    carvedilol (COREG) 25 MG tablet, TAKE 1 TABLET BY MOUTH TWICE DAILY WITH MEALS, Disp: 180 tablet, Rfl: 3    clopidogrel (PLAVIX) 75 MG tablet, Take 1 tablet by mouth daily, Disp: 90 tablet, Rfl: 3    atorvastatin (LIPITOR) 80 MG tablet, Take 1 tablet by mouth daily, Disp: 90 tablet, Rfl: 3    isosorbide mononitrate (IMDUR) 120 MG extended release tablet, Take 1 tablet by mouth daily, Disp: 90 tablet, Rfl: 3    metOLazone (ZAROXOLYN) 5 MG tablet, Take 1 tablet by mouth daily, Disp: 90 tablet, Rfl: 3    aspirin 81 MG EC tablet, Take 81 mg by mouth daily, Disp: , Rfl:     docusate sodium (COLACE) 50 MG capsule, Take 100 mg by mouth, Disp: , Rfl:     doxepin (SINEQUAN) 10 MG capsule, Take 30 mg by mouth, Disp: , Rfl:     DULoxetine (CYMBALTA) 60 MG extended release capsule, Take 60 mg by mouth, Disp: , Rfl:     nitroGLYCERIN (NITROSTAT) 0.4 MG SL tablet, Place 0.4 mg under the tongue, Disp: , Rfl:     potassium chloride (KLOR-CON M) 20 MEQ extended release tablet, Take 40 mEq by mouth daily, Disp: , Rfl:   No Known Allergies  Past Medical History:   Diagnosis Date    Arrhythmia     a fib; s/p ablation; pacer dependent    Arthritis     Atrial fibrillation ([address] 06/2000    CAD (coronary artery disease)     Chest pain 05/2000    Chronic kidney disease     1 kidney    Chronic obstructive pulmonary disease (HCC)     Chronic renal failure, stage 4 (severe) ([address] 1/13/2023    Coronary artery disease involving native coronary artery of native heart without angina pectoris 11/7/2017    Dyspnea 3/1/2016    Edema 08/2010    Heart failure (HCC)     HFrEF (heart failure with reduced ejection fraction) ([address] 1/13/2023    Hypercholesterolemia     Hypertension     Ischemic cardiomyopathy 3/1/2016    LV dysfunction 06/2000    Morbid obesity (HCC)     Other ill-defined conditions(799.89)     MI x2    Pacemaker 07/2009    Thrombus 03/2012    Unstable angina ([address] 09/2000     Past Surgical History:   Procedure Laterality Date    CARDIAC DEFIBRILLATOR PLACEMENT      CORONARY ARTERY BYPASS GRAFT      PACEMAKER      ICD    [place] UNLISTED PROCEDURE CARDIAC SURGERY      S/P CABG     No family history on file. Social History     Tobacco Use    Smoking status: Former     Packs/day: 1.00     Types: Cigarettes     Quit date: 2000     Years since quittin.0    Smokeless tobacco: Current    Tobacco comments:     Quit smoking: STOPPED IN    Substance Use Topics    Alcohol use: No       ROS:    Review of Systems   Constitutional: Positive for fatigue and malaise/fatigue. Negative for chills, decreased appetite, diaphoresis, fever and unexpected weight change. HENT:  Negative for congestion, hearing loss, hoarse voice and nosebleeds. Eyes:  Negative for blurred vision. Cardiovascular:  Positive for chest pain, dyspnea on exertion and leg swelling. Negative for claudication, cyanosis, irregular heartbeat, near-syncope, orthopnea, palpitations, paroxysmal nocturnal dyspnea and syncope. Respiratory:  Negative for shortness of breath, sputum production and wheezing. Endocrine: Negative for polydipsia, polyphagia and polyuria. Skin:  Negative for color change. Musculoskeletal:  Negative for muscle weakness. Gastrointestinal:  Negative for abdominal pain, bowel incontinence, diarrhea, hematemesis and hematochezia. Genitourinary:  Negative for dysuria, frequency, hematuria and nocturia. Neurological:  Negative for focal weakness, light-headedness, loss of balance, numbness, sensory change and weakness. Psychiatric/Behavioral:  Negative for altered mental status and memory loss. PHYSICAL EXAM:   /81   Pulse 90   Ht 6' 2\" (1.88 m)   Wt (!) 303 lb 6.4 oz (137.6 kg)   BMI 38.95 kg/m²      Physical Exam  Constitutional:       Appearance: Normal appearance. He is obese. HENT:      Head: Normocephalic and atraumatic. Nose: Nose normal.   Eyes:      Extraocular Movements: Extraocular movements intact.       Pupils: Pupils are equal, round, and reactive to light.   Neck:      Vascular: No carotid bruit. Cardiovascular:      Rate and Rhythm: Regular rhythm. Pulses: Normal pulses. Heart sounds: No murmur heard. Pulmonary:      Effort: Pulmonary effort is normal.      Breath sounds: Normal breath sounds. Abdominal:      General: Abdomen is flat. Bowel sounds are normal.      Palpations: Abdomen is soft. Musculoskeletal:         General: Swelling (1+bilateral LE edema) present. Normal range of motion. Cervical back: Normal range of motion and neck supple. Right lower leg: Edema present. Left lower leg: Edema present. Skin:     General: Skin is warm and dry. Neurological:      General: No focal deficit present. Mental Status: He is alert and oriented to person, place, and time. Psychiatric:         Mood and Affect: Mood normal.       Medical problems and test results were reviewed with the patient today. No results found for this or any previous visit (from the past 672 hour(s)). Lab Results   Component Value Date/Time    CHOL 199 01/25/2022 08:18 AM    HDL 46 01/25/2022 08:18 AM     No results found for any visits on 01/13/23. ASSESSMENT and PLAN    Diagnoses and all orders for this visit:    Coronary artery disease of bypass graft of native heart with stable angina pectoris (HCC)  -     ranolazine (RANEXA) 500 MG extended release tablet; TAKE ONE TABLET BY MOUTH TWO (2) TIMES A DAY. -     carvedilol (COREG) 25 MG tablet; TAKE 1 TABLET BY MOUTH TWICE DAILY WITH MEALS  -     clopidogrel (PLAVIX) 75 MG tablet; Take 1 tablet by mouth daily  -     atorvastatin (LIPITOR) 80 MG tablet; Take 1 tablet by mouth daily  -     isosorbide mononitrate (IMDUR) 120 MG extended release tablet; Take 1 tablet by mouth daily    Essential hypertension  -     hydrALAZINE (APRESOLINE) 50 MG tablet; TAKE 0.5 TABLET BY MOUTH TWO (2) TIMES A DAY.   -     carvedilol (COREG) 25 MG tablet; TAKE 1 TABLET BY MOUTH TWICE DAILY WITH MEALS    ICD (implantable cardioverter-defibrillator) in place    Ischemic cardiomyopathy  -     torsemide (DEMADEX) 100 MG tablet; TAKE ONE TABLET BY MOUTH TWO (2) TIMES A DAY. -     hydrALAZINE (APRESOLINE) 50 MG tablet; TAKE 0.5 TABLET BY MOUTH TWO (2) TIMES A DAY. -     carvedilol (COREG) 25 MG tablet; TAKE 1 TABLET BY MOUTH TWICE DAILY WITH MEALS    Chronic atrial fibrillation (HCC)  -     carvedilol (COREG) 25 MG tablet; TAKE 1 TABLET BY MOUTH TWICE DAILY WITH MEALS    HFrEF (heart failure with reduced ejection fraction) ([facility]): Worse. Add Zaroxolyn prn. BMP in 2 weeks  -     torsemide (DEMADEX) 100 MG tablet; TAKE ONE TABLET BY MOUTH TWO (2) TIMES A DAY. -     hydrALAZINE (APRESOLINE) 50 MG tablet; TAKE 0.5 TABLET BY MOUTH TWO (2) TIMES A DAY. -     carvedilol (COREG) 25 MG tablet; TAKE 1 TABLET BY MOUTH TWICE DAILY WITH MEALS  -     metOLazone (ZAROXOLYN) 5 MG tablet; Take 1 tablet by mouth daily  -     Basic Metabolic Panel; Future    Chronic renal failure, stage 4 (severe) ([facility]): Follow up with Nephrology ASAP  -     Basic Metabolic Panel; Future        Disposition:    Return in about 4 weeks (around 2/10/2023), or BMP before next appointment. , for Follow up after Med change.                 Orion Brumfield MD  1/13/2023  9:47 AM

## 2023-04-03 ENCOUNTER — OFFICE VISIT (OUTPATIENT)
Dept: CARDIOLOGY CLINIC | Age: 73
End: 2023-04-03
Payer: COMMERCIAL

## 2023-04-03 VITALS
WEIGHT: 303 LBS | BODY MASS INDEX: 38.9 KG/M2 | SYSTOLIC BLOOD PRESSURE: 94 MMHG | DIASTOLIC BLOOD PRESSURE: 61 MMHG | HEART RATE: 86 BPM

## 2023-04-03 DIAGNOSIS — I10 ESSENTIAL HYPERTENSION: Primary | ICD-10-CM

## 2023-04-03 DIAGNOSIS — I50.20 HFREF (HEART FAILURE WITH REDUCED EJECTION FRACTION) (HCC): ICD-10-CM

## 2023-04-03 DIAGNOSIS — I25.5 ISCHEMIC CARDIOMYOPATHY: ICD-10-CM

## 2023-04-03 DIAGNOSIS — I48.20 CHRONIC ATRIAL FIBRILLATION (HCC): ICD-10-CM

## 2023-04-03 DIAGNOSIS — I25.118 CORONARY ARTERY DISEASE OF NATIVE ARTERY OF NATIVE HEART WITH STABLE ANGINA PECTORIS (HCC): ICD-10-CM

## 2023-04-03 PROCEDURE — 3074F SYST BP LT 130 MM HG: CPT | Performed by: INTERNAL MEDICINE

## 2023-04-03 PROCEDURE — 3078F DIAST BP <80 MM HG: CPT | Performed by: INTERNAL MEDICINE

## 2023-04-03 PROCEDURE — 93000 ELECTROCARDIOGRAM COMPLETE: CPT | Performed by: INTERNAL MEDICINE

## 2023-04-03 PROCEDURE — 99214 OFFICE O/P EST MOD 30 MIN: CPT | Performed by: INTERNAL MEDICINE

## 2023-04-03 PROCEDURE — 1123F ACP DISCUSS/DSCN MKR DOCD: CPT | Performed by: INTERNAL MEDICINE

## 2023-04-03 ASSESSMENT — ENCOUNTER SYMPTOMS
SHORTNESS OF BREATH: 0
ORTHOPNEA: 0
WHEEZING: 0
DIARRHEA: 0
BOWEL INCONTINENCE: 0
HEMATOCHEZIA: 0
ABDOMINAL PAIN: 0
BLURRED VISION: 0
HOARSE VOICE: 0
COLOR CHANGE: 0
SPUTUM PRODUCTION: 0
HEMATEMESIS: 0

## 2023-04-03 NOTE — PROGRESS NOTES
3/1/2016    LV dysfunction 2000    Morbid obesity ([facility]     Other ill-defined conditions(799.89)     MI x2    Pacemaker 2009    Thrombus 2012    Unstable angina ([facility] 2000     Past Surgical History:   Procedure Laterality Date    CARDIAC DEFIBRILLATOR PLACEMENT      CORONARY ARTERY BYPASS GRAFT      PACEMAKER      ICD    [place] UNLISTED PROCEDURE CARDIAC SURGERY      S/P CABG     History reviewed. No pertinent family history. Social History     Tobacco Use    Smoking status: Former     Packs/day: 1.00     Types: Cigarettes     Quit date: 2000     Years since quittin.2    Smokeless tobacco: Current    Tobacco comments:     Quit smoking: STOPPED IN    Substance Use Topics    Alcohol use: No       ROS:    Review of Systems   Constitutional: Negative for chills, decreased appetite, diaphoresis, fatigue, fever, malaise/fatigue and unexpected weight change. HENT:  Negative for congestion, hearing loss, hoarse voice and nosebleeds. Eyes:  Negative for blurred vision. Cardiovascular:  Negative for chest pain, claudication, cyanosis, dyspnea on exertion, irregular heartbeat, leg swelling, near-syncope, orthopnea, palpitations, paroxysmal nocturnal dyspnea and syncope. Respiratory:  Negative for shortness of breath, sputum production and wheezing. Endocrine: Negative for polydipsia, polyphagia and polyuria. Skin:  Negative for color change. Musculoskeletal:  Negative for muscle weakness. Gastrointestinal:  Negative for abdominal pain, bowel incontinence, diarrhea, hematemesis and hematochezia. Genitourinary:  Negative for dysuria, frequency, hematuria and nocturia. Neurological:  Positive for dizziness and light-headedness. Negative for focal weakness, loss of balance, numbness, sensory change and weakness. Psychiatric/Behavioral:  Negative for altered mental status and memory loss.           PHYSICAL EXAM:   BP 94/61   Pulse 86   Wt (!) 303 lb (137.4 kg)   BMI 38.90 kg/m²

## 2023-05-02 ENCOUNTER — TELEPHONE (OUTPATIENT)
Dept: CARDIOLOGY CLINIC | Age: 73
End: 2023-05-02

## 2023-05-02 NOTE — TELEPHONE ENCOUNTER
Patient-wife Ibeth Jorge called to report patient got so dizzy earlier today and almost fail. Wife said these dizzy spells are getting more frequent. She stated patient no longer takes amlodipine and hydraAlazine but still has blair spells. Blood pressure after the dizzy spell today was 113/42.

## 2023-05-02 NOTE — TELEPHONE ENCOUNTER
Pt's wife and EC states pt is having more frequent dizzy spells on standing. BP today after dizzy spell was 113/42. Did not get pt's HR. Pt has device. Pt taking coreg 25mg bid. Isosorbide 120mg daily    Triage informed Dr. Radha Medellin. Per Dr. Radha Medellin, reduce coreg to 12.5mg bid. Triage spoke with device clinic. Per Jennifer Mahan: pt does not have home monitor and needs device interrogation. He may come to [facility] [employer] tomorrow for interrogation. Triage informed pt's wife of above. She verbalizes understanding and agrees to plan. Confirms appt for device interrogation.

## 2023-07-03 ENCOUNTER — TELEPHONE (OUTPATIENT)
Age: 73
End: 2023-07-03

## 2023-07-24 DIAGNOSIS — I25.708 CORONARY ARTERY DISEASE OF BYPASS GRAFT OF NATIVE HEART WITH STABLE ANGINA PECTORIS (HCC): ICD-10-CM

## 2023-07-24 NOTE — TELEPHONE ENCOUNTER
MEDICATION REFILL REQUEST    Name of Medication:  plavix  Dose:  75 mg  Frequency:  qd  Quantity:  30  Days' supply:  30 days    Pharmacy Name/Location:  [address] [place] [address]    *pt would also like to know if Dr Janae Fuentes would approve nephrologist putting pt on [facility].

## 2023-07-25 DIAGNOSIS — Z95.810 ICD (IMPLANTABLE CARDIOVERTER-DEFIBRILLATOR) IN PLACE: ICD-10-CM

## 2023-07-25 DIAGNOSIS — I50.20 HFREF (HEART FAILURE WITH REDUCED EJECTION FRACTION) (HCC): Primary | ICD-10-CM

## 2023-07-25 DIAGNOSIS — I25.5 ISCHEMIC CARDIOMYOPATHY: ICD-10-CM

## 2023-07-25 DIAGNOSIS — I44.2 COMPLETE HEART BLOCK (HCC): ICD-10-CM

## 2023-07-25 RX ORDER — CLOPIDOGREL BISULFATE 75 MG/1
75 TABLET ORAL DAILY
Qty: 90 TABLET | Refills: 3 | Status: SHIPPED | OUTPATIENT
Start: 2023-07-25

## 2023-10-06 DIAGNOSIS — I48.20 CHRONIC ATRIAL FIBRILLATION (HCC): ICD-10-CM

## 2023-10-06 DIAGNOSIS — I10 ESSENTIAL HYPERTENSION: ICD-10-CM

## 2023-10-06 DIAGNOSIS — I50.20 HFREF (HEART FAILURE WITH REDUCED EJECTION FRACTION) (HCC): ICD-10-CM

## 2023-10-06 DIAGNOSIS — I25.5 ISCHEMIC CARDIOMYOPATHY: ICD-10-CM

## 2023-10-06 DIAGNOSIS — I25.708 CORONARY ARTERY DISEASE OF BYPASS GRAFT OF NATIVE HEART WITH STABLE ANGINA PECTORIS (HCC): ICD-10-CM

## 2023-10-06 RX ORDER — CARVEDILOL 25 MG/1
TABLET ORAL
Qty: 180 TABLET | Refills: 3 | Status: SHIPPED | OUTPATIENT
Start: 2023-10-06

## 2023-11-17 DIAGNOSIS — I25.708 CORONARY ARTERY DISEASE OF BYPASS GRAFT OF NATIVE HEART WITH STABLE ANGINA PECTORIS (HCC): ICD-10-CM

## 2023-11-17 RX ORDER — ATORVASTATIN CALCIUM 80 MG/1
80 TABLET, FILM COATED ORAL DAILY
Qty: 90 TABLET | Refills: 3 | Status: SHIPPED | OUTPATIENT
Start: 2023-11-17

## 2024-01-02 DIAGNOSIS — I50.20 HFREF (HEART FAILURE WITH REDUCED EJECTION FRACTION) (HCC): ICD-10-CM

## 2024-01-02 DIAGNOSIS — I25.708 CORONARY ARTERY DISEASE OF BYPASS GRAFT OF NATIVE HEART WITH STABLE ANGINA PECTORIS (HCC): ICD-10-CM

## 2024-01-02 DIAGNOSIS — I25.5 ISCHEMIC CARDIOMYOPATHY: ICD-10-CM

## 2024-01-02 RX ORDER — TORSEMIDE 100 MG/1
TABLET ORAL
Qty: 180 TABLET | Refills: 3 | Status: SHIPPED | OUTPATIENT
Start: 2024-01-02

## 2024-01-02 RX ORDER — RANOLAZINE 500 MG/1
TABLET, EXTENDED RELEASE ORAL
Qty: 180 TABLET | Refills: 3 | Status: SHIPPED | OUTPATIENT
Start: 2024-01-02

## 2024-01-04 ENCOUNTER — TELEPHONE (OUTPATIENT)
Age: 74
End: 2024-01-04

## 2024-01-04 NOTE — TELEPHONE ENCOUNTER
Patient said he does not have anymore on hand.      isosorbide mononitrate (IMDUR) 120 MG extended release tablet [[id number]]    Order Details  Dose: 120 mg Route: Oral Frequency: DAILY   Dispense Quantity: 90 tablet Refills: 3          Sig: Take 1 tablet by mouth daily     Pharmacy    [facility] - [place] - [address] [phone] - F [phone]  [address] [place] [address]  Phone: [phone]  Fax: [phone]

## 2024-01-19 ENCOUNTER — TELEPHONE (OUTPATIENT)
Age: 74
End: 2024-01-19

## 2024-01-19 NOTE — TELEPHONE ENCOUNTER
Per Dr. Green, \"Low risk for cataract surgey.Patient is on Plavix.OK to hold 5-10 days if eye surgery requires if not continue plavix\"

## 2024-01-19 NOTE — TELEPHONE ENCOUNTER
Patient having Cataract surgery on TBD under MAC. Requesting cardiac clearance and any other medication. Fax: [phone] ATTN: Marybeth

## 2024-01-29 DIAGNOSIS — I25.708 CORONARY ARTERY DISEASE OF BYPASS GRAFT OF NATIVE HEART WITH STABLE ANGINA PECTORIS (HCC): ICD-10-CM

## 2024-01-29 RX ORDER — ISOSORBIDE MONONITRATE 120 MG/1
120 TABLET, EXTENDED RELEASE ORAL DAILY
Qty: 90 TABLET | Refills: 3 | Status: SHIPPED | OUTPATIENT
Start: 2024-01-29

## 2024-05-24 ENCOUNTER — NURSE ONLY (OUTPATIENT)
Age: 74
End: 2024-05-24

## 2024-05-24 DIAGNOSIS — I50.20 HFREF (HEART FAILURE WITH REDUCED EJECTION FRACTION) (HCC): Primary | ICD-10-CM

## 2024-08-08 DIAGNOSIS — I25.708 CORONARY ARTERY DISEASE OF BYPASS GRAFT OF NATIVE HEART WITH STABLE ANGINA PECTORIS (HCC): ICD-10-CM

## 2024-08-09 DIAGNOSIS — I25.708 CORONARY ARTERY DISEASE OF BYPASS GRAFT OF NATIVE HEART WITH STABLE ANGINA PECTORIS (HCC): ICD-10-CM

## 2024-08-09 RX ORDER — CLOPIDOGREL BISULFATE 75 MG/1
TABLET ORAL
Qty: 90 TABLET | Refills: 3 | Status: SHIPPED | OUTPATIENT
Start: 2024-08-09

## 2024-08-09 RX ORDER — CLOPIDOGREL BISULFATE 75 MG/1
TABLET ORAL
Qty: 90 TABLET | Refills: 3 | Status: SHIPPED | OUTPATIENT
Start: 2024-08-09 | End: 2024-08-09 | Stop reason: SDUPTHER

## 2024-08-09 NOTE — TELEPHONE ENCOUNTER
MEDICATION REFILL REQUEST      Name of Medication:  clopidogrel (PLAVIX) 75 MG tablet   Dose:    Frequency:    Quantity:  3  Days' supply:  90      Pharmacy Name/Location:  On File      Please call and advise.

## 2024-09-09 ENCOUNTER — OFFICE VISIT (OUTPATIENT)
Age: 74
End: 2024-09-09
Payer: COMMERCIAL

## 2024-09-09 VITALS
HEART RATE: 96 BPM | WEIGHT: 315 LBS | DIASTOLIC BLOOD PRESSURE: 90 MMHG | BODY MASS INDEX: 40.43 KG/M2 | HEIGHT: 74 IN | SYSTOLIC BLOOD PRESSURE: 155 MMHG

## 2024-09-09 DIAGNOSIS — I50.20 HFREF (HEART FAILURE WITH REDUCED EJECTION FRACTION) (HCC): ICD-10-CM

## 2024-09-09 DIAGNOSIS — I50.23 ACUTE ON CHRONIC SYSTOLIC CONGESTIVE HEART FAILURE (HCC): ICD-10-CM

## 2024-09-09 DIAGNOSIS — Z95.810 ICD (IMPLANTABLE CARDIOVERTER-DEFIBRILLATOR) IN PLACE: ICD-10-CM

## 2024-09-09 DIAGNOSIS — I25.10 CORONARY ARTERY DISEASE INVOLVING NATIVE CORONARY ARTERY OF NATIVE HEART WITHOUT ANGINA PECTORIS: ICD-10-CM

## 2024-09-09 DIAGNOSIS — N18.4 CHRONIC RENAL FAILURE, STAGE 4 (SEVERE) (HCC): ICD-10-CM

## 2024-09-09 DIAGNOSIS — I10 ESSENTIAL HYPERTENSION: ICD-10-CM

## 2024-09-09 DIAGNOSIS — I25.5 ISCHEMIC CARDIOMYOPATHY: Primary | ICD-10-CM

## 2024-09-09 PROCEDURE — 3080F DIAST BP >= 90 MM HG: CPT | Performed by: INTERNAL MEDICINE

## 2024-09-09 PROCEDURE — 3077F SYST BP >= 140 MM HG: CPT | Performed by: INTERNAL MEDICINE

## 2024-09-09 PROCEDURE — 1123F ACP DISCUSS/DSCN MKR DOCD: CPT | Performed by: INTERNAL MEDICINE

## 2024-09-09 PROCEDURE — 99214 OFFICE O/P EST MOD 30 MIN: CPT | Performed by: INTERNAL MEDICINE

## 2024-09-09 RX ORDER — METOLAZONE 5 MG/1
5 TABLET ORAL DAILY
Qty: 30 TABLET | Refills: 5 | Status: SHIPPED | OUTPATIENT
Start: 2024-09-09

## 2024-09-09 ASSESSMENT — ENCOUNTER SYMPTOMS
DIARRHEA: 0
HOARSE VOICE: 0
HEMATEMESIS: 0
SHORTNESS OF BREATH: 0
WHEEZING: 0
SPUTUM PRODUCTION: 0
HEMATOCHEZIA: 0
BLURRED VISION: 0
COLOR CHANGE: 0
ABDOMINAL PAIN: 0
BOWEL INCONTINENCE: 0
ORTHOPNEA: 0

## 2024-10-17 DIAGNOSIS — I25.708 CORONARY ARTERY DISEASE OF BYPASS GRAFT OF NATIVE HEART WITH STABLE ANGINA PECTORIS (HCC): ICD-10-CM

## 2024-10-17 DIAGNOSIS — I10 ESSENTIAL HYPERTENSION: ICD-10-CM

## 2024-10-17 DIAGNOSIS — I50.20 HFREF (HEART FAILURE WITH REDUCED EJECTION FRACTION) (HCC): ICD-10-CM

## 2024-10-17 DIAGNOSIS — I48.20 CHRONIC ATRIAL FIBRILLATION (HCC): ICD-10-CM

## 2024-10-17 DIAGNOSIS — I25.5 ISCHEMIC CARDIOMYOPATHY: ICD-10-CM

## 2024-10-17 RX ORDER — CARVEDILOL 25 MG/1
TABLET ORAL
Qty: 180 TABLET | Refills: 3 | Status: SHIPPED | OUTPATIENT
Start: 2024-10-17

## 2024-10-21 ENCOUNTER — TELEPHONE (OUTPATIENT)
Age: 74
End: 2024-10-21

## 2024-10-21 NOTE — TELEPHONE ENCOUNTER
Patient stopped his potassium because at one point it was to high.  He just had more labs done and it was normal. They are asking if he should start taking again or not.

## 2024-10-22 NOTE — TELEPHONE ENCOUNTER
Per Dr. Green,   No.Monitor it periodically with PCP.     Called pt wife, informed her of response

## 2024-10-23 ENCOUNTER — OFFICE VISIT (OUTPATIENT)
Age: 74
End: 2024-10-23
Payer: MEDICARE

## 2024-10-23 VITALS
DIASTOLIC BLOOD PRESSURE: 78 MMHG | HEART RATE: 73 BPM | SYSTOLIC BLOOD PRESSURE: 122 MMHG | WEIGHT: 304 LBS | HEIGHT: 75 IN | BODY MASS INDEX: 37.8 KG/M2

## 2024-10-23 DIAGNOSIS — I25.5 ISCHEMIC CARDIOMYOPATHY: ICD-10-CM

## 2024-10-23 DIAGNOSIS — Z95.810 ICD (IMPLANTABLE CARDIOVERTER-DEFIBRILLATOR) IN PLACE: ICD-10-CM

## 2024-10-23 DIAGNOSIS — I48.20 CHRONIC ATRIAL FIBRILLATION (HCC): ICD-10-CM

## 2024-10-23 DIAGNOSIS — I10 ESSENTIAL HYPERTENSION: Primary | ICD-10-CM

## 2024-10-23 DIAGNOSIS — I25.118 CORONARY ARTERY DISEASE OF NATIVE ARTERY OF NATIVE HEART WITH STABLE ANGINA PECTORIS (HCC): ICD-10-CM

## 2024-10-23 DIAGNOSIS — I50.20 HFREF (HEART FAILURE WITH REDUCED EJECTION FRACTION) (HCC): ICD-10-CM

## 2024-10-23 PROCEDURE — 4004F PT TOBACCO SCREEN RCVD TLK: CPT | Performed by: INTERNAL MEDICINE

## 2024-10-23 PROCEDURE — 3074F SYST BP LT 130 MM HG: CPT | Performed by: INTERNAL MEDICINE

## 2024-10-23 PROCEDURE — G8417 CALC BMI ABV UP PARAM F/U: HCPCS | Performed by: INTERNAL MEDICINE

## 2024-10-23 PROCEDURE — 93000 ELECTROCARDIOGRAM COMPLETE: CPT | Performed by: INTERNAL MEDICINE

## 2024-10-23 PROCEDURE — G8484 FLU IMMUNIZE NO ADMIN: HCPCS | Performed by: INTERNAL MEDICINE

## 2024-10-23 PROCEDURE — 3017F COLORECTAL CA SCREEN DOC REV: CPT | Performed by: INTERNAL MEDICINE

## 2024-10-23 PROCEDURE — G8427 DOCREV CUR MEDS BY ELIG CLIN: HCPCS | Performed by: INTERNAL MEDICINE

## 2024-10-23 PROCEDURE — 1160F RVW MEDS BY RX/DR IN RCRD: CPT | Performed by: INTERNAL MEDICINE

## 2024-10-23 PROCEDURE — 1126F AMNT PAIN NOTED NONE PRSNT: CPT | Performed by: INTERNAL MEDICINE

## 2024-10-23 PROCEDURE — 1123F ACP DISCUSS/DSCN MKR DOCD: CPT | Performed by: INTERNAL MEDICINE

## 2024-10-23 PROCEDURE — 99214 OFFICE O/P EST MOD 30 MIN: CPT | Performed by: INTERNAL MEDICINE

## 2024-10-23 PROCEDURE — 1159F MED LIST DOCD IN RCRD: CPT | Performed by: INTERNAL MEDICINE

## 2024-10-23 PROCEDURE — 3078F DIAST BP <80 MM HG: CPT | Performed by: INTERNAL MEDICINE

## 2024-10-23 RX ORDER — CLOPIDOGREL BISULFATE 75 MG/1
TABLET ORAL
Qty: 90 TABLET | Refills: 3 | Status: SHIPPED | OUTPATIENT
Start: 2024-10-23

## 2024-10-23 RX ORDER — ATORVASTATIN CALCIUM 80 MG/1
80 TABLET, FILM COATED ORAL DAILY
Qty: 90 TABLET | Refills: 3 | Status: SHIPPED | OUTPATIENT
Start: 2024-10-23

## 2024-10-23 RX ORDER — CARVEDILOL 25 MG/1
TABLET ORAL
Qty: 180 TABLET | Refills: 3 | Status: SHIPPED | OUTPATIENT
Start: 2024-10-23

## 2024-10-23 RX ORDER — RANOLAZINE 500 MG/1
TABLET, EXTENDED RELEASE ORAL
Qty: 180 TABLET | Refills: 3 | Status: SHIPPED | OUTPATIENT
Start: 2024-10-23

## 2024-10-23 RX ORDER — ISOSORBIDE MONONITRATE 120 MG/1
120 TABLET, EXTENDED RELEASE ORAL DAILY
Qty: 90 TABLET | Refills: 3 | Status: SHIPPED | OUTPATIENT
Start: 2024-10-23

## 2024-10-23 ASSESSMENT — ENCOUNTER SYMPTOMS
HEMATEMESIS: 0
HEMATOCHEZIA: 0
ORTHOPNEA: 0
HOARSE VOICE: 0
BOWEL INCONTINENCE: 0
WHEEZING: 0
DIARRHEA: 0
SPUTUM PRODUCTION: 0
ABDOMINAL PAIN: 0
CHEST TIGHTNESS: 0
BLURRED VISION: 0
COLOR CHANGE: 0
SHORTNESS OF BREATH: 1

## 2024-10-23 NOTE — PROGRESS NOTES
[facility] CARDIOLOGY  [address] [place] [address], [address]  [place] [address]  PHONE: [phone]        10/23/24        NAME:  Saravanan Sánchez  : 1950  MRN: [id number]       SUBJECTIVE:   Saravanan Sánchez is a 74 y.o. male seen for a follow up visit regarding the following: CAD,Ischemic cardiomyopathy,ICD,CRF and primary hypertension.Recently,he reported worsening BAEZ,peripheral edema,and fatigue.Recent follow up echo reported LV EF=55-60%  with mild MR & LVH.He reports recent admission for hyponatremia and hypokalemia.He reports feeling better.I discussed recent echo findings with the patient and his wife.    Chief Complaint   Patient presents with    Coronary Artery Disease       HPI:    Coronary Artery Disease  Presents for follow-up visit. Symptoms include muscle weakness and shortness of breath. Pertinent negatives include no chest pain, chest pressure, chest tightness, dizziness, leg swelling, palpitations or weight gain. The symptoms have been improving.       Past Medical History, Past Surgical History, Family history, Social History, and Medications were all reviewed with the patient today and updated as necessary.         Current Outpatient Medications:     atorvastatin (LIPITOR) 80 MG tablet, Take 1 tablet by mouth daily, Disp: 90 tablet, Rfl: 3    carvedilol (COREG) 25 MG tablet, TAKE ONE TABLET BY MOUTH TWICE DAILY WITH MEALS, Disp: 180 tablet, Rfl: 3    clopidogrel (PLAVIX) 75 MG tablet, TAKE 1 TABLET BY MOUTH DAILY FOR THIN BLOOD, Disp: 90 tablet, Rfl: 3    isosorbide mononitrate (IMDUR) 120 MG extended release tablet, Take 1 tablet by mouth daily, Disp: 90 tablet, Rfl: 3    ranolazine (RANEXA) 500 MG extended release tablet, TAKE ONE TABLET BY MOUTH TWO (2) TIMES A DAY., Disp: 180 tablet, Rfl: 3    torsemide (DEMADEX) 100 MG tablet, TAKE ONE TABLET BY MOUTH TWO (2) TIMES A DAY. (Patient taking differently: Take 40 mg by mouth daily), Disp: 180 tablet, Rfl: 3    albuterol sulfate HFA

## 2025-01-16 ENCOUNTER — TELEPHONE (OUTPATIENT)
Age: 75
End: 2025-01-16

## 2025-01-16 NOTE — TELEPHONE ENCOUNTER
Wife, Kelsey, states patient is in CKD stage 4, and will probably need to begin dialysis.   Has hospitalized in [facility] x 2 and seen in [facility] ER x 1 with low potassium.   Awaiting results of today's potassium level.   Very bad swelling in feet and legs to above knees, hands, and abdomen.   Knees and ankles are sometimes painful.  Recent 10 lb weight gain.   Increased SOB when walking from room to room in house.   Kelsey states BP and HR okay, but he does not have readings.   Has been told that he must qualify for dialysis and Dr. Green must give clearance that heart is okay for dialysis.   Plans to change from Dr. Shafer in [place] to [facility] in near future.   Taking ASA 81 mg qd, Lipitor 80 mg qd, carvedilol 25 mg BID, Plavix 75 mg qd, Imdur 120 mg qd, KCL 20 mEq 2 tabs qd, Ranexa 500 mg BID, and torsemide 100 mg 2 tabs qd.     Kelsey asks for Dr. Green's recommendations for swelling and possible need for dialysis. She states patient would like to discuss dialysis with Dr. Green, and requests call from Dr. Green or appointment with Dr. Green.     Advised Kelsey that I will notify Dr. Green of above when her returns to office, tomorrow, and call with his response. Kelsey verbalized understanding.

## 2025-01-17 NOTE — TELEPHONE ENCOUNTER
Vipin Green MD Keener, Lynn F, RN  Caller: Unspecified (Yesterday,  2:26 PM)  Nephrology needs to manage edema with renal failure is my recommendation.

## 2025-01-17 NOTE — TELEPHONE ENCOUNTER
Advised Kelsey of Dr. Green's response. Kelsey verbalized understanding. She states skin is peeling back with bloody drainage from area on leg. Advised Kelsey to call Dr. Quiñones's office for appointment or take patient to urgent care for evaluation. Kelsey verbalized understanding.

## 2025-02-08 DIAGNOSIS — I50.20 HFREF (HEART FAILURE WITH REDUCED EJECTION FRACTION) (HCC): ICD-10-CM

## 2025-02-08 DIAGNOSIS — I25.5 ISCHEMIC CARDIOMYOPATHY: ICD-10-CM

## 2025-02-10 NOTE — TELEPHONE ENCOUNTER
Requested Prescriptions     Pending Prescriptions Disp Refills    torsemide (DEMADEX) 100 MG tablet [Pharmacy Med Name: TORSEMIDE 100 MG ORAL TABLET] 180 tablet 3     Sig: TAKE ONE TABLET BY MOUTH TWO (2) TIMES A DAY FOR FLUID     Verified rx. Last OV 10/23/24. Erx to pharm on file.

## 2025-02-11 RX ORDER — TORSEMIDE 100 MG/1
TABLET ORAL
Qty: 180 TABLET | Refills: 3 | Status: SHIPPED | OUTPATIENT
Start: 2025-02-11

## 2025-07-21 ENCOUNTER — TELEPHONE (OUTPATIENT)
Age: 75
End: 2025-07-21

## 2025-07-21 NOTE — TELEPHONE ENCOUNTER
Pt's wife wants him seen this week. States pt feels really bad. He has no energy. Said he can hardly walk. She said he has been having a lot of angina. He says he feels like he is having heartburn at times. Pt is sob with movement. He will be out of breathe just walking to the bathroom and back.

## 2025-07-21 NOTE — TELEPHONE ENCOUNTER
T.C. from pt's wife, verbal ok given from pt to speak with pt. Pt's wife said pt has increase angina and having SOB real bad, sates can't even walk to the restroom without given out and becoming SOB. Pt's wife also states pt just feels terrible and very weak. Pt has appt. With Dr Green on 8/4/25. Nurse told pt's wife that Dr Green is off this week and if pt thinks he can't wait til appt. then he may need to call PCP or be seen in ER. Pt's wife said pt said ok and will call PCP and if he can't see pt then pt will goto the ER to be checked.

## 2025-08-04 ENCOUNTER — OFFICE VISIT (OUTPATIENT)
Age: 75
End: 2025-08-04
Payer: MEDICARE

## 2025-08-04 VITALS
HEART RATE: 79 BPM | DIASTOLIC BLOOD PRESSURE: 86 MMHG | BODY MASS INDEX: 39.17 KG/M2 | WEIGHT: 315 LBS | SYSTOLIC BLOOD PRESSURE: 138 MMHG | HEIGHT: 75 IN

## 2025-08-04 DIAGNOSIS — N18.4 CHRONIC RENAL FAILURE, STAGE 4 (SEVERE) (HCC): ICD-10-CM

## 2025-08-04 DIAGNOSIS — I25.110 CORONARY ARTERY DISEASE INVOLVING NATIVE CORONARY ARTERY OF NATIVE HEART WITH UNSTABLE ANGINA PECTORIS (HCC): ICD-10-CM

## 2025-08-04 DIAGNOSIS — I25.5 ISCHEMIC CARDIOMYOPATHY: Primary | ICD-10-CM

## 2025-08-04 PROCEDURE — 1160F RVW MEDS BY RX/DR IN RCRD: CPT | Performed by: INTERNAL MEDICINE

## 2025-08-04 PROCEDURE — 3075F SYST BP GE 130 - 139MM HG: CPT | Performed by: INTERNAL MEDICINE

## 2025-08-04 PROCEDURE — 3079F DIAST BP 80-89 MM HG: CPT | Performed by: INTERNAL MEDICINE

## 2025-08-04 PROCEDURE — G8417 CALC BMI ABV UP PARAM F/U: HCPCS | Performed by: INTERNAL MEDICINE

## 2025-08-04 PROCEDURE — 1123F ACP DISCUSS/DSCN MKR DOCD: CPT | Performed by: INTERNAL MEDICINE

## 2025-08-04 PROCEDURE — 3017F COLORECTAL CA SCREEN DOC REV: CPT | Performed by: INTERNAL MEDICINE

## 2025-08-04 PROCEDURE — 4004F PT TOBACCO SCREEN RCVD TLK: CPT | Performed by: INTERNAL MEDICINE

## 2025-08-04 PROCEDURE — 1159F MED LIST DOCD IN RCRD: CPT | Performed by: INTERNAL MEDICINE

## 2025-08-04 PROCEDURE — G8427 DOCREV CUR MEDS BY ELIG CLIN: HCPCS | Performed by: INTERNAL MEDICINE

## 2025-08-04 PROCEDURE — 99214 OFFICE O/P EST MOD 30 MIN: CPT | Performed by: INTERNAL MEDICINE

## 2025-08-04 PROCEDURE — 1126F AMNT PAIN NOTED NONE PRSNT: CPT | Performed by: INTERNAL MEDICINE

## 2025-08-04 PROCEDURE — 93000 ELECTROCARDIOGRAM COMPLETE: CPT | Performed by: INTERNAL MEDICINE

## 2025-08-04 RX ORDER — NITROGLYCERIN 0.4 MG/1
0.4 TABLET SUBLINGUAL EVERY 5 MIN PRN
Qty: 25 TABLET | Refills: 3 | Status: SHIPPED | OUTPATIENT
Start: 2025-08-04

## 2025-08-04 RX ORDER — POTASSIUM CHLORIDE 1500 MG/1
40 TABLET, EXTENDED RELEASE ORAL DAILY
Qty: 180 TABLET | Refills: 3 | Status: CANCELLED | OUTPATIENT
Start: 2025-08-04

## 2025-08-04 RX ORDER — RANOLAZINE 1000 MG/1
1000 TABLET, EXTENDED RELEASE ORAL 2 TIMES DAILY
Qty: 180 TABLET | Refills: 3 | Status: SHIPPED | OUTPATIENT
Start: 2025-08-04

## 2025-08-04 ASSESSMENT — ENCOUNTER SYMPTOMS
COLOR CHANGE: 0
SPUTUM PRODUCTION: 0
DIARRHEA: 0
WHEEZING: 0
BOWEL INCONTINENCE: 0
BLURRED VISION: 0
SHORTNESS OF BREATH: 0
ABDOMINAL PAIN: 0
ORTHOPNEA: 0
HEMATOCHEZIA: 0
HOARSE VOICE: 0
HEMATEMESIS: 0

## 2025-08-12 DIAGNOSIS — I25.118 CORONARY ARTERY DISEASE OF NATIVE ARTERY OF NATIVE HEART WITH STABLE ANGINA PECTORIS: ICD-10-CM

## 2025-08-13 RX ORDER — CLOPIDOGREL BISULFATE 75 MG/1
TABLET ORAL
Qty: 90 TABLET | Refills: 3 | Status: SHIPPED | OUTPATIENT
Start: 2025-08-13

## 2025-08-29 ENCOUNTER — CLINICAL SUPPORT (OUTPATIENT)
Age: 75
End: 2025-08-29

## 2025-08-29 DIAGNOSIS — I50.20 HFREF (HEART FAILURE WITH REDUCED EJECTION FRACTION) (HCC): ICD-10-CM

## 2025-08-29 DIAGNOSIS — I25.5 ISCHEMIC CARDIOMYOPATHY: Primary | ICD-10-CM

## (undated) DEVICE — CATHETER DIAG AD 6FR L100CM 0.038IN COR POLYUR INT MAMM

## (undated) DEVICE — CATHETER DIAG AD 6FR L100CM 0.038IN STD COR POLYUR JUDKINS

## (undated) DEVICE — CATHETER DIAG 6FR L100CM GWIRE 0.038IN S STL POLYUR MP W/ 2

## (undated) DEVICE — GDWIRE SYS HI-TORQ 0.035X260CM -- WHOLEY - ORDER BY PK/3

## (undated) DEVICE — GUIDE NDL ST W/ 14 X 147CM TELESCOPICALLY FLD CIV FLX CVR

## (undated) DEVICE — CATHETER ANGIO JL4 0.038 IN AD 6 FRX100 CM STD SUPER TORQUE

## (undated) DEVICE — CATHETER ANGIO 5FR L100CM GRY S STL NYL JR4 3 SEG BRAID L

## (undated) DEVICE — CATHETER DIAG AD 6FR L100CM 0.038IN COR POLYUR AMPLATZ 1

## (undated) DEVICE — GUIDEWIRE 035IN 210CM PTFE COAT FIX COR J TIP 15MM FIRM BODY

## (undated) DEVICE — PINNACLE INTRODUCER SHEATH: Brand: PINNACLE